# Patient Record
Sex: FEMALE | Race: WHITE | Employment: FULL TIME | URBAN - METROPOLITAN AREA
[De-identification: names, ages, dates, MRNs, and addresses within clinical notes are randomized per-mention and may not be internally consistent; named-entity substitution may affect disease eponyms.]

---

## 2021-11-19 ENCOUNTER — APPOINTMENT (OUTPATIENT)
Dept: GENERAL RADIOLOGY | Age: 66
DRG: 177 | End: 2021-11-19
Attending: EMERGENCY MEDICINE
Payer: MEDICARE

## 2021-11-19 ENCOUNTER — APPOINTMENT (OUTPATIENT)
Dept: CT IMAGING | Age: 66
DRG: 177 | End: 2021-11-19
Attending: EMERGENCY MEDICINE
Payer: MEDICARE

## 2021-11-19 ENCOUNTER — HOSPITAL ENCOUNTER (INPATIENT)
Age: 66
LOS: 4 days | Discharge: HOME | DRG: 177 | End: 2021-11-23
Attending: EMERGENCY MEDICINE | Admitting: HOSPITALIST
Payer: MEDICARE

## 2021-11-19 DIAGNOSIS — U07.1 COVID: Primary | ICD-10-CM

## 2021-11-19 DIAGNOSIS — R09.02 HYPOXIA: ICD-10-CM

## 2021-11-19 PROBLEM — J12.82 PNEUMONIA DUE TO COVID-19 VIRUS: Status: ACTIVE | Noted: 2021-11-19

## 2021-11-19 LAB
ALBUMIN SERPL-MCNC: 3.2 G/DL (ref 3.5–5)
ALBUMIN/GLOB SERPL: 0.9 {RATIO} (ref 1.1–2.2)
ALP SERPL-CCNC: 69 U/L (ref 45–117)
ALT SERPL-CCNC: 48 U/L (ref 12–78)
ANION GAP SERPL CALC-SCNC: 3 MMOL/L (ref 5–15)
AST SERPL-CCNC: 42 U/L (ref 15–37)
BASOPHILS # BLD: 0 K/UL (ref 0–0.1)
BASOPHILS NFR BLD: 0 % (ref 0–1)
BILIRUB SERPL-MCNC: 0.4 MG/DL (ref 0.2–1)
BUN SERPL-MCNC: 6 MG/DL (ref 6–20)
BUN/CREAT SERPL: 11 (ref 12–20)
CALCIUM SERPL-MCNC: 8.9 MG/DL (ref 8.5–10.1)
CHLORIDE SERPL-SCNC: 99 MMOL/L (ref 97–108)
CO2 SERPL-SCNC: 30 MMOL/L (ref 21–32)
COMMENT, HOLDF: NORMAL
COVID-19 RAPID TEST, COVR: DETECTED
CREAT SERPL-MCNC: 0.55 MG/DL (ref 0.55–1.02)
D DIMER PPP FEU-MCNC: 1.59 MG/L FEU (ref 0–0.65)
DIFFERENTIAL METHOD BLD: ABNORMAL
EOSINOPHIL # BLD: 0 K/UL (ref 0–0.4)
EOSINOPHIL NFR BLD: 0 % (ref 0–7)
ERYTHROCYTE [DISTWIDTH] IN BLOOD BY AUTOMATED COUNT: 12.3 % (ref 11.5–14.5)
GLOBULIN SER CALC-MCNC: 3.7 G/DL (ref 2–4)
GLUCOSE SERPL-MCNC: 134 MG/DL (ref 65–100)
HCT VFR BLD AUTO: 39.5 % (ref 35–47)
HGB BLD-MCNC: 13.3 G/DL (ref 11.5–16)
IMM GRANULOCYTES # BLD AUTO: 0 K/UL (ref 0–0.04)
IMM GRANULOCYTES NFR BLD AUTO: 1 % (ref 0–0.5)
LYMPHOCYTES # BLD: 0.5 K/UL (ref 0.8–3.5)
LYMPHOCYTES NFR BLD: 13 % (ref 12–49)
MCH RBC QN AUTO: 30.7 PG (ref 26–34)
MCHC RBC AUTO-ENTMCNC: 33.7 G/DL (ref 30–36.5)
MCV RBC AUTO: 91.2 FL (ref 80–99)
MONOCYTES # BLD: 0.2 K/UL (ref 0–1)
MONOCYTES NFR BLD: 5 % (ref 5–13)
NEUTS SEG # BLD: 3.3 K/UL (ref 1.8–8)
NEUTS SEG NFR BLD: 81 % (ref 32–75)
NRBC # BLD: 0 K/UL (ref 0–0.01)
NRBC BLD-RTO: 0 PER 100 WBC
PLATELET # BLD AUTO: 194 K/UL (ref 150–400)
PMV BLD AUTO: 9.9 FL (ref 8.9–12.9)
POTASSIUM SERPL-SCNC: 3.8 MMOL/L (ref 3.5–5.1)
PROCALCITONIN SERPL-MCNC: <0.05 NG/ML
PROT SERPL-MCNC: 6.9 G/DL (ref 6.4–8.2)
RBC # BLD AUTO: 4.33 M/UL (ref 3.8–5.2)
RBC MORPH BLD: ABNORMAL
SAMPLES BEING HELD,HOLD: NORMAL
SODIUM SERPL-SCNC: 132 MMOL/L (ref 136–145)
SOURCE, COVRS: ABNORMAL
WBC # BLD AUTO: 4 K/UL (ref 3.6–11)

## 2021-11-19 PROCEDURE — 71275 CT ANGIOGRAPHY CHEST: CPT

## 2021-11-19 PROCEDURE — 87635 SARS-COV-2 COVID-19 AMP PRB: CPT

## 2021-11-19 PROCEDURE — 36415 COLL VENOUS BLD VENIPUNCTURE: CPT

## 2021-11-19 PROCEDURE — 93005 ELECTROCARDIOGRAM TRACING: CPT

## 2021-11-19 PROCEDURE — 99285 EMERGENCY DEPT VISIT HI MDM: CPT

## 2021-11-19 PROCEDURE — 74011250636 HC RX REV CODE- 250/636: Performed by: HOSPITALIST

## 2021-11-19 PROCEDURE — 85025 COMPLETE CBC W/AUTO DIFF WBC: CPT

## 2021-11-19 PROCEDURE — 71045 X-RAY EXAM CHEST 1 VIEW: CPT

## 2021-11-19 PROCEDURE — 65270000029 HC RM PRIVATE

## 2021-11-19 PROCEDURE — 85379 FIBRIN DEGRADATION QUANT: CPT

## 2021-11-19 PROCEDURE — 84145 PROCALCITONIN (PCT): CPT

## 2021-11-19 PROCEDURE — 74011000636 HC RX REV CODE- 636: Performed by: RADIOLOGY

## 2021-11-19 PROCEDURE — 74011250636 HC RX REV CODE- 250/636: Performed by: EMERGENCY MEDICINE

## 2021-11-19 PROCEDURE — 80053 COMPREHEN METABOLIC PANEL: CPT

## 2021-11-19 PROCEDURE — 96374 THER/PROPH/DIAG INJ IV PUSH: CPT

## 2021-11-19 RX ORDER — POLYETHYLENE GLYCOL 3350 17 G/17G
17 POWDER, FOR SOLUTION ORAL DAILY PRN
Status: DISCONTINUED | OUTPATIENT
Start: 2021-11-19 | End: 2021-11-23 | Stop reason: HOSPADM

## 2021-11-19 RX ORDER — ENOXAPARIN SODIUM 100 MG/ML
40 INJECTION SUBCUTANEOUS EVERY 12 HOURS
Status: DISCONTINUED | OUTPATIENT
Start: 2021-11-20 | End: 2021-11-23 | Stop reason: HOSPADM

## 2021-11-19 RX ORDER — SODIUM CHLORIDE 0.9 % (FLUSH) 0.9 %
5-40 SYRINGE (ML) INJECTION EVERY 8 HOURS
Status: DISCONTINUED | OUTPATIENT
Start: 2021-11-19 | End: 2021-11-23 | Stop reason: HOSPADM

## 2021-11-19 RX ORDER — ONDANSETRON 4 MG/1
4 TABLET, ORALLY DISINTEGRATING ORAL
Status: DISCONTINUED | OUTPATIENT
Start: 2021-11-19 | End: 2021-11-23 | Stop reason: HOSPADM

## 2021-11-19 RX ORDER — SODIUM CHLORIDE 0.9 % (FLUSH) 0.9 %
5-40 SYRINGE (ML) INJECTION AS NEEDED
Status: DISCONTINUED | OUTPATIENT
Start: 2021-11-19 | End: 2021-11-23 | Stop reason: HOSPADM

## 2021-11-19 RX ORDER — ONDANSETRON 2 MG/ML
4 INJECTION INTRAMUSCULAR; INTRAVENOUS
Status: DISCONTINUED | OUTPATIENT
Start: 2021-11-19 | End: 2021-11-23 | Stop reason: HOSPADM

## 2021-11-19 RX ORDER — ACETAMINOPHEN 325 MG/1
650 TABLET ORAL
Status: DISCONTINUED | OUTPATIENT
Start: 2021-11-19 | End: 2021-11-23 | Stop reason: HOSPADM

## 2021-11-19 RX ORDER — ACETAMINOPHEN 325 MG/1
975 TABLET ORAL
Status: DISCONTINUED | OUTPATIENT
Start: 2021-11-19 | End: 2021-11-19

## 2021-11-19 RX ORDER — DEXAMETHASONE SODIUM PHOSPHATE 10 MG/ML
6 INJECTION INTRAMUSCULAR; INTRAVENOUS ONCE
Status: COMPLETED | OUTPATIENT
Start: 2021-11-19 | End: 2021-11-19

## 2021-11-19 RX ORDER — ENOXAPARIN SODIUM 100 MG/ML
30 INJECTION SUBCUTANEOUS EVERY 12 HOURS
Status: DISCONTINUED | OUTPATIENT
Start: 2021-11-19 | End: 2021-11-19 | Stop reason: DRUGHIGH

## 2021-11-19 RX ORDER — KETOROLAC TROMETHAMINE 30 MG/ML
15 INJECTION, SOLUTION INTRAMUSCULAR; INTRAVENOUS
Status: COMPLETED | OUTPATIENT
Start: 2021-11-19 | End: 2021-11-19

## 2021-11-19 RX ORDER — ACETAMINOPHEN 650 MG/1
650 SUPPOSITORY RECTAL
Status: DISCONTINUED | OUTPATIENT
Start: 2021-11-19 | End: 2021-11-23 | Stop reason: HOSPADM

## 2021-11-19 RX ADMIN — KETOROLAC TROMETHAMINE 15 MG: 30 INJECTION, SOLUTION INTRAMUSCULAR; INTRAVENOUS at 23:02

## 2021-11-19 RX ADMIN — Medication 10 ML: at 23:02

## 2021-11-19 RX ADMIN — IOPAMIDOL 80 ML: 755 INJECTION, SOLUTION INTRAVENOUS at 23:03

## 2021-11-19 RX ADMIN — DEXAMETHASONE SODIUM PHOSPHATE 6 MG: 10 INJECTION, SOLUTION INTRAMUSCULAR; INTRAVENOUS at 22:00

## 2021-11-19 RX ADMIN — ENOXAPARIN SODIUM 40 MG: 100 INJECTION SUBCUTANEOUS at 23:23

## 2021-11-20 LAB
ALBUMIN SERPL-MCNC: 3 G/DL (ref 3.5–5)
ALBUMIN/GLOB SERPL: 0.9 {RATIO} (ref 1.1–2.2)
ALP SERPL-CCNC: 70 U/L (ref 45–117)
ALT SERPL-CCNC: 49 U/L (ref 12–78)
ANION GAP SERPL CALC-SCNC: 2 MMOL/L (ref 5–15)
AST SERPL-CCNC: 37 U/L (ref 15–37)
ATRIAL RATE: 91 BPM
BASOPHILS # BLD: 0 K/UL (ref 0–0.1)
BASOPHILS NFR BLD: 0 % (ref 0–1)
BILIRUB SERPL-MCNC: 0.3 MG/DL (ref 0.2–1)
BUN SERPL-MCNC: 6 MG/DL (ref 6–20)
BUN/CREAT SERPL: 12 (ref 12–20)
CALCIUM SERPL-MCNC: 8.5 MG/DL (ref 8.5–10.1)
CALCULATED P AXIS, ECG09: 30 DEGREES
CALCULATED R AXIS, ECG10: -2 DEGREES
CALCULATED T AXIS, ECG11: 28 DEGREES
CHLORIDE SERPL-SCNC: 101 MMOL/L (ref 97–108)
CO2 SERPL-SCNC: 28 MMOL/L (ref 21–32)
CREAT SERPL-MCNC: 0.51 MG/DL (ref 0.55–1.02)
CRP SERPL-MCNC: 3.88 MG/DL (ref 0–0.6)
DIAGNOSIS, 93000: NORMAL
DIFFERENTIAL METHOD BLD: ABNORMAL
EOSINOPHIL # BLD: 0 K/UL (ref 0–0.4)
EOSINOPHIL NFR BLD: 0 % (ref 0–7)
ERYTHROCYTE [DISTWIDTH] IN BLOOD BY AUTOMATED COUNT: 12.3 % (ref 11.5–14.5)
GLOBULIN SER CALC-MCNC: 3.2 G/DL (ref 2–4)
GLUCOSE SERPL-MCNC: 174 MG/DL (ref 65–100)
HCT VFR BLD AUTO: 39.4 % (ref 35–47)
HGB BLD-MCNC: 13.1 G/DL (ref 11.5–16)
IMM GRANULOCYTES # BLD AUTO: 0 K/UL (ref 0–0.04)
IMM GRANULOCYTES NFR BLD AUTO: 1 % (ref 0–0.5)
LYMPHOCYTES # BLD: 0.4 K/UL (ref 0.8–3.5)
LYMPHOCYTES NFR BLD: 8 % (ref 12–49)
MCH RBC QN AUTO: 30.5 PG (ref 26–34)
MCHC RBC AUTO-ENTMCNC: 33.2 G/DL (ref 30–36.5)
MCV RBC AUTO: 91.8 FL (ref 80–99)
MONOCYTES # BLD: 0.1 K/UL (ref 0–1)
MONOCYTES NFR BLD: 2 % (ref 5–13)
NEUTS SEG # BLD: 4.1 K/UL (ref 1.8–8)
NEUTS SEG NFR BLD: 89 % (ref 32–75)
NRBC # BLD: 0 K/UL (ref 0–0.01)
NRBC BLD-RTO: 0 PER 100 WBC
P-R INTERVAL, ECG05: 124 MS
PLATELET # BLD AUTO: 186 K/UL (ref 150–400)
PMV BLD AUTO: 10 FL (ref 8.9–12.9)
POTASSIUM SERPL-SCNC: 4.4 MMOL/L (ref 3.5–5.1)
POTASSIUM SERPL-SCNC: 5.2 MMOL/L (ref 3.5–5.1)
PROT SERPL-MCNC: 6.2 G/DL (ref 6.4–8.2)
Q-T INTERVAL, ECG07: 352 MS
QRS DURATION, ECG06: 72 MS
QTC CALCULATION (BEZET), ECG08: 432 MS
RBC # BLD AUTO: 4.29 M/UL (ref 3.8–5.2)
RBC MORPH BLD: ABNORMAL
SODIUM SERPL-SCNC: 131 MMOL/L (ref 136–145)
VENTRICULAR RATE, ECG03: 91 BPM
WBC # BLD AUTO: 4.6 K/UL (ref 3.6–11)

## 2021-11-20 PROCEDURE — 36415 COLL VENOUS BLD VENIPUNCTURE: CPT

## 2021-11-20 PROCEDURE — 84132 ASSAY OF SERUM POTASSIUM: CPT

## 2021-11-20 PROCEDURE — 65270000029 HC RM PRIVATE

## 2021-11-20 PROCEDURE — 85025 COMPLETE CBC W/AUTO DIFF WBC: CPT

## 2021-11-20 PROCEDURE — 74011250637 HC RX REV CODE- 250/637: Performed by: INTERNAL MEDICINE

## 2021-11-20 PROCEDURE — XW0DXM6 INTRODUCTION OF BARICITINIB INTO MOUTH AND PHARYNX, EXTERNAL APPROACH, NEW TECHNOLOGY GROUP 6: ICD-10-PCS | Performed by: INTERNAL MEDICINE

## 2021-11-20 PROCEDURE — 74011250636 HC RX REV CODE- 250/636: Performed by: HOSPITALIST

## 2021-11-20 PROCEDURE — 74011250637 HC RX REV CODE- 250/637: Performed by: HOSPITALIST

## 2021-11-20 PROCEDURE — 86140 C-REACTIVE PROTEIN: CPT

## 2021-11-20 PROCEDURE — 80053 COMPREHEN METABOLIC PANEL: CPT

## 2021-11-20 RX ORDER — LISINOPRIL 10 MG/1
20 TABLET ORAL DAILY
Status: DISCONTINUED | OUTPATIENT
Start: 2021-11-20 | End: 2021-11-23 | Stop reason: HOSPADM

## 2021-11-20 RX ORDER — FLUOXETINE HYDROCHLORIDE 20 MG/1
40 CAPSULE ORAL DAILY
Status: DISCONTINUED | OUTPATIENT
Start: 2021-11-20 | End: 2021-11-23 | Stop reason: HOSPADM

## 2021-11-20 RX ORDER — ZINC SULFATE 50(220)MG
1 CAPSULE ORAL DAILY
Status: DISCONTINUED | OUTPATIENT
Start: 2021-11-21 | End: 2021-11-23 | Stop reason: HOSPADM

## 2021-11-20 RX ORDER — DIAZEPAM 10 MG/1
5 TABLET ORAL
Status: DISCONTINUED | OUTPATIENT
Start: 2021-11-20 | End: 2021-11-23 | Stop reason: HOSPADM

## 2021-11-20 RX ORDER — ASCORBIC ACID 500 MG
500 TABLET ORAL 2 TIMES DAILY
Status: DISCONTINUED | OUTPATIENT
Start: 2021-11-20 | End: 2021-11-23 | Stop reason: HOSPADM

## 2021-11-20 RX ORDER — ATORVASTATIN CALCIUM 10 MG/1
10 TABLET, FILM COATED ORAL
Status: DISCONTINUED | OUTPATIENT
Start: 2021-11-20 | End: 2021-11-23 | Stop reason: HOSPADM

## 2021-11-20 RX ADMIN — Medication 5 ML: at 22:00

## 2021-11-20 RX ADMIN — ENOXAPARIN SODIUM 40 MG: 100 INJECTION SUBCUTANEOUS at 12:18

## 2021-11-20 RX ADMIN — Medication 10 ML: at 06:02

## 2021-11-20 RX ADMIN — LISINOPRIL 20 MG: 10 TABLET ORAL at 09:07

## 2021-11-20 RX ADMIN — ATORVASTATIN CALCIUM 10 MG: 10 TABLET, FILM COATED ORAL at 09:07

## 2021-11-20 RX ADMIN — FLUOXETINE 40 MG: 20 CAPSULE ORAL at 09:07

## 2021-11-20 RX ADMIN — BARICITINIB 4 MG: 2 TABLET, FILM COATED ORAL at 12:18

## 2021-11-20 RX ADMIN — DEXAMETHASONE 6 MG: 1 TABLET ORAL at 09:07

## 2021-11-20 RX ADMIN — OXYCODONE HYDROCHLORIDE AND ACETAMINOPHEN 500 MG: 500 TABLET ORAL at 19:02

## 2021-11-20 NOTE — ROUTINE PROCESS
TRANSFER - OUT REPORT:    Verbal report given to RN Sharlene(name) on Clay Rodriguez  being transferred to (unit) for routine progression of care       Report consisted of patients Situation, Background, Assessment and   Recommendations(SBAR). Information from the following report(s) SBAR, Kardex, ED Summary and MAR was reviewed with the receiving nurse. Lines:   Peripheral IV 11/19/21 Left Antecubital (Active)   Site Assessment Clean, dry, & intact 11/19/21 2040   Phlebitis Assessment 0 11/19/21 2040   Infiltration Assessment 0 11/19/21 2040   Dressing Status Clean, dry, & intact 11/19/21 2040   Dressing Type Transparent 11/19/21 2040   Hub Color/Line Status Pink 11/19/21 2040        Opportunity for questions and clarification was provided.       Patient transported with:   O2 @ 2 liters  Tech

## 2021-11-20 NOTE — ED TRIAGE NOTES
78year old female pt comes to the ED for a CC SOB/ COVID positive. Pt states that she has been feeling SOB all day and felt hot. Pt tested positive for Covid on the 10th. EMS state that she was sating at 80 percent when they arrived. Pt was found to be 87 percent on room air in the ED. Pt was placed on O2 via nasal cannula at 2 lpm and now sating at 94 percent. EMS also state that she has vomited once. Pt is A&Ox4.

## 2021-11-20 NOTE — PROGRESS NOTES
Pt's daughter has called for updates several times. Pt with periodic confusion. Daughter states \"I am a nurse and I had Covid and I did not have confusion. \" Discussed patient mentation and diagnosis with risk factors for confusion while inpatient. On assessment pt answers orientation questions and states \"sometimes things are foggy in my head. \" Informed physician regarding daughter's concerns. No new orders at this time.

## 2021-11-20 NOTE — ACP (ADVANCE CARE PLANNING)
Advance Care Planning     Advance Care Planning (ACP) Physician/NP/PA Conversation      Date of Conversation: 11/19/2021  Conducted with: Patient with Decision Making Capacity    Healthcare Decision Maker:   No healthcare decision makers have been documented. Click here to complete [address] including selection of the Healthcare Decision Maker Relationship (ie \"Primary\")    Care Preferences:    Hospitalization: \"If your health worsens and it becomes clear that your chance of recovery is unlikely, what would be your preference regarding hospitalization? \"  The patient would prefer hospitalization. Ventilation: \"If you were unable to breathe on your own and your chance of recovery was unlikely, what would be your preference about the use of a ventilator (breathing machine) if it was available to you? \"   The patient is unsure. and she will think about it, full code for now. Resuscitation: \"In the event your heart stopped as a result of an underlying serious health condition, would you want attempts to be made to restart your heart, or would you prefer a natural death? \"   Yes, attempt to resuscitate.     Additional topics discussed: treatment goals, ventilation preferences, resuscitation preferences and end of life care preferences (vegetative state/imminent death)    Conversation Outcomes / Follow-Up Plan:   ACP in process - information provided, considering goals and options  Reviewed DNR/DNI and patient elects Full Code (Attempt Resuscitation)     Length of Voluntary ACP Conversation in minutes:  16 minutes    Angel Castellon MD

## 2021-11-20 NOTE — PROGRESS NOTES
Transition of Care Plan:                  -RUR 6%  -From home, lives with daughter  -Independent, no devices  -Has 14 steps, may benefit from PT/OT evaluation for DME if appropriate.   -TransportationTBD (Daughter if appropriate)    Reason for Admission:  Shortness of breath. CM unable to reach patient via phone. Assessment completed with patients daughter. Patient lives with daughter in 2 story home that has no steps to enter and 13 steps to 2nd story where the patient resides. The patient is independent, she uses no assisting devices. The patient is a smoker. She has no history of home O2, on room air. Patient uses an inhaler during cold season. She started using the inhaler due to history of PNA several years ago. PCP [facility] El[facility] Patients daughter endorsed that patient has not had home health or been to rehab facility in the past. Patient rarely drives but can drive when stable. Daughter will transport if appropriate at discharge. CM unable to discuss MCR right to appeal with patient per no answer via phone and contact precaution. CM discussed with daughter and she advised no concerns at this time. Medicare pt has received, reviewed, and signed 1st IM letter informing them of their right to appeal the discharge. Signed copy has been placed on pt bedside chart. RUR Score:   6%                 Plan for utilizing home health:    TBD      PCP: First and Last name:  Viri Lewis MD     Current Advanced Directive/Advance Care Plan: Full Code      Healthcare Decision Maker:   Juana Trinidad [phone]    Care Management Interventions  PCP Verified by CM: Yes  Mode of Transport at Discharge:  (TBD)  Transition of Care Consult (CM Consult):  Other (Home with family )  Discharge Durable Medical Equipment: No  Physical Therapy Consult: Yes  Occupational Therapy Consult: Yes  Support Systems: Child(emil)  Confirm Follow Up Transport: Self  The Patient and/or Patient Representative was Provided with a Choice of Provider and Agrees with the Discharge Plan?: Yes  Freedom of Choice List was Provided with Basic Dialogue that Supports the Patient's Individualized Plan of Care/Goals, Treatment Preferences and Shares the Quality Data Associated with the Providers?: Yes  Discharge Location  Discharge Placement: Unable to determine at this time    Eric Tesfaye [facility]

## 2021-11-20 NOTE — H&P
HISTORY AND PHYSICAL      PCP: Maged Spears MD  History source: the patient      CC: shortness of breath      HPI: 78 y.o lady w/ HTN and remote tobacco use who presents with shortness of breath. She was diagnosed with COVID-19 one week ago, was doing better but suddenly began feeling worse. She describes progressive dyspnea, generalized headaches, subjective fever and body aches. She denies diarrhea, has occasional nausea and vomited once. She took ibuprofen without much relief of her symptoms. PMH/PSH:  Past Medical History:   Diagnosis Date    Hypertension     Ill-defined condition     high cholesterol    Psychiatric disorder      Past Surgical History:   Procedure Laterality Date    HX GYN      c section    HX OTHER SURGICAL      breast augmentation       Home meds:   Prior to Admission medications    Medication Sig Start Date End Date Taking? Authorizing Provider   oxyCODONE-acetaminophen (PERCOCET) 5-325 mg per tablet Take 1 Tab by mouth every four (4) hours as needed for Pain. Max Daily Amount: 6 Tabs. 2/8/15   Rayray Peres PA   diazepam (VALIUM) 5 mg tablet Take 1 Tab by mouth every twelve (12) hours as needed (spasm). Max Daily Amount: 10 mg. 2/8/15   KENDAL Hagan   FLUoxetine (PROZAC) 40 mg capsule Take 40 mg by mouth daily. Anamika Nair MD   lisinopril (PRINIVIL, ZESTRIL) 10 mg tablet Take 10 mg by mouth daily. Anamika Nair MD   simvastatin (ZOCOR) 20 mg tablet Take 20 mg by mouth nightly. Anamika Nair MD   amoxicillin-clavulanate (AUGMENTIN) 875-125 mg per tablet Take 1 tablet by mouth two (2) times a day. Anamika Nair MD   HYDROmorphone (DILAUDID) 2 mg tablet Take 1 tablet by mouth every four (4) hours as needed for Pain. 2/5/15   Esvin Orozco MD   ondansetron hcl (ZOFRAN, AS HYDROCHLORIDE,) 4 mg tablet Take 1 tablet by mouth every eight (8) hours as needed for Nausea.  2/5/15   Esvin Orozco MD       Allergies:  No Known Allergies    FH:  History reviewed. No pertinent family history. SH:  Social History     Tobacco Use    Smoking status: Current Some Day Smoker    Smokeless tobacco: Not on file   Substance Use Topics    Alcohol use: No       ROS: A comprehensive review of systems was negative except for that written in the HPI.       PHYSICAL EXAM:  Visit Vitals  BP (!) 145/84   Pulse 87   Temp 99.4 °F (37.4 °C)   Resp 16   Ht 5' 2\" (1.575 m)   Wt 85.3 kg (188 lb 0.8 oz)   SpO2 93%   BMI 34.40 kg/m²       Gen: NAD, non-toxic  HEENT: anicteric sclerae, normal conjunctiva  Neck: supple, trachea midline, no adenopathy  Heart: RRR, no MRG, no JVD, no peripheral edema  Lungs: feint bibasilar crackles, non-labored respirations  Abd: soft, NT, ND, BS+  Extr: warm  Skin: dry  Neuro: CN II-XII grossly intact, normal speech, moves all extremities  Psych: normal mood, appropriate affect      Labs/Imaging:  Recent Results (from the past 24 hour(s))   EKG, 12 LEAD, INITIAL    Collection Time: 11/19/21  8:31 PM   Result Value Ref Range    Ventricular Rate 91 BPM    Atrial Rate 91 BPM    P-R Interval 124 ms    QRS Duration 72 ms    Q-T Interval 352 ms    QTC Calculation (Bezet) 432 ms    Calculated P Axis 30 degrees    Calculated R Axis -2 degrees    Calculated T Axis 28 degrees    Diagnosis       Normal sinus rhythm  Minimal voltage criteria for LVH, may be normal variant ( R in aVL )  Nonspecific ST abnormality  Abnormal ECG  When compared with ECG of 23-DEC-2008 13:10,  Previous ECG has undetermined rhythm, needs review     CBC WITH AUTOMATED DIFF    Collection Time: 11/19/21  8:45 PM   Result Value Ref Range    WBC 4.0 3.6 - 11.0 K/uL    RBC 4.33 3.80 - 5.20 M/uL    HGB 13.3 11.5 - 16.0 g/dL    HCT 39.5 35.0 - 47.0 %    MCV 91.2 80.0 - 99.0 FL    MCH 30.7 26.0 - 34.0 PG    MCHC 33.7 30.0 - 36.5 g/dL    RDW 12.3 11.5 - 14.5 %    PLATELET 050 458 - 815 K/uL    MPV 9.9 8.9 - 12.9 FL    NRBC 0.0 0  WBC    ABSOLUTE NRBC 0.00 0.00 - 0.01 K/uL    NEUTROPHILS 81 (H) 32 - 75 %    LYMPHOCYTES 13 12 - 49 %    MONOCYTES 5 5 - 13 %    EOSINOPHILS 0 0 - 7 %    BASOPHILS 0 0 - 1 %    IMMATURE GRANULOCYTES 1 (H) 0.0 - 0.5 %    ABS. NEUTROPHILS 3.3 1.8 - 8.0 K/UL    ABS. LYMPHOCYTES 0.5 (L) 0.8 - 3.5 K/UL    ABS. MONOCYTES 0.2 0.0 - 1.0 K/UL    ABS. EOSINOPHILS 0.0 0.0 - 0.4 K/UL    ABS. BASOPHILS 0.0 0.0 - 0.1 K/UL    ABS. IMM. GRANS. 0.0 0.00 - 0.04 K/UL    DF SMEAR SCANNED      RBC COMMENTS NORMOCYTIC, NORMOCHROMIC     METABOLIC PANEL, COMPREHENSIVE    Collection Time: 11/19/21  8:45 PM   Result Value Ref Range    Sodium 132 (L) 136 - 145 mmol/L    Potassium 3.8 3.5 - 5.1 mmol/L    Chloride 99 97 - 108 mmol/L    CO2 30 21 - 32 mmol/L    Anion gap 3 (L) 5 - 15 mmol/L    Glucose 134 (H) 65 - 100 mg/dL    BUN 6 6 - 20 MG/DL    Creatinine 0.55 0.55 - 1.02 MG/DL    BUN/Creatinine ratio 11 (L) 12 - 20      GFR est AA >60 >60 ml/min/1.73m2    GFR est non-AA >60 >60 ml/min/1.73m2    Calcium 8.9 8.5 - 10.1 MG/DL    Bilirubin, total 0.4 0.2 - 1.0 MG/DL    ALT (SGPT) 48 12 - 78 U/L    AST (SGOT) 42 (H) 15 - 37 U/L    Alk. phosphatase 69 45 - 117 U/L    Protein, total 6.9 6.4 - 8.2 g/dL    Albumin 3.2 (L) 3.5 - 5.0 g/dL    Globulin 3.7 2.0 - 4.0 g/dL    A-G Ratio 0.9 (L) 1.1 - 2.2     SAMPLES BEING HELD    Collection Time: 11/19/21  8:45 PM   Result Value Ref Range    SAMPLES BEING HELD 1RED, 1BLU     COMMENT        Add-on orders for these samples will be processed based on acceptable specimen integrity and analyte stability, which may vary by analyte.    D DIMER    Collection Time: 11/19/21  8:45 PM   Result Value Ref Range    D-dimer 1.59 (H) 0.00 - 0.65 mg/L FEU   PROCALCITONIN    Collection Time: 11/19/21  8:45 PM   Result Value Ref Range    Procalcitonin <0.05 ng/mL   COVID-19 RAPID TEST    Collection Time: 11/19/21 10:01 PM   Result Value Ref Range    Specimen source Nasopharyngeal      COVID-19 rapid test Detected (AA) NOTD         Recent Labs 11/19/21 2045   WBC 4.0   HGB 13.3   HCT 39.5        Recent Labs     11/19/21 2045   *   K 3.8   CL 99   CO2 30   BUN 6   CREA 0.55   *   CA 8.9     Recent Labs     11/19/21 2045   ALT 48   AP 69   TBILI 0.4   TP 6.9   ALB 3.2*   GLOB 3.7       No results for input(s): CPK, CKNDX, TROIQ in the last 72 hours. No lab exists for component: CPKMB    No results for input(s): INR, PTP, APTT, INREXT in the last 72 hours. No results for input(s): PH, PCO2, PO2 in the last 72 hours. CTA CHEST W OR W WO CONT    Result Date: 11/19/2021  No evidence of acute pulmonary embolus. Diffuse bilateral patchy consolidation and groundglass opacities are compatible with COVID pneumonia. Mild hepatic steatosis. XR CHEST PORT    Result Date: 11/19/2021  Small areas of patchy opacification in the lung bases likely related to the patient's history of viral illness.            Assessment & Plan:     Acute respiratory failure with hypoxia due to COVID-19 pneumonia:  -O2 support  -dexamethasone  -procalcitonin low will not start abx for now  -check CRP    HTN:  -continue lisinopril     DVT ppx: sq Lovenox  Code status: full  Disposition: home when ready    Signed By: Margarette Gutiérrez MD     November 19, 2021

## 2021-11-20 NOTE — PROGRESS NOTES
[address] [facility]  Hospitalist Group                                                                                          Hospitalist Progress Note  Tasha Siddiqui MD  Answering service: [phone] OR [phone] from in house phone        Date of Service:  2021  NAME:  Kalyani Lua  :  1955  MRN:  [id number]      Admission Summary:   Copied for Dr. Ash Epstein H&P:    HPI: 78 y.o lady w/ HTN and remote tobacco use who presents with shortness of breath. She was diagnosed with COVID-19 one week ago, was doing better but suddenly began feeling worse. She describes progressive dyspnea, generalized headaches, subjective fever and body aches. She denies diarrhea, has occasional nausea and vomited once. She took ibuprofen without much relief of her symptoms. Interval history / Subjective:   Patient tells me that she is feeling better and offers no new complaints. She tells me that she is not vaccinated. Assessment & Plan:     Acute respiratory failure with hypoxia due to COVID-19 pneumonia:  -O2 support 2 L  -dexamethasone, baricitinib, Vit C and zinc  - Lovenox  -procalcitonin low, CRP 3.88 -- will not start abx for now     HTN, controlled  -continue lisinopril     Hyperkalemia  -repeat K    Code status: Full  DVT prophylaxis: Lovenox    Care Plan discussed with: Patient/Family  Anticipated Disposition: Home w/Family  Anticipated Discharge: Greater than 48 hours     Hospital Problems  Never Reviewed          Codes Class Noted POA    Pneumonia due to COVID-19 virus ICD-10-CM: U07.1, J12.82  ICD-9-CM: 480.8, 079.89  2021 Unknown                Review of Systems:   Pertinent items are noted in HPI. Vital Signs:    Last 24hrs VS reviewed since prior progress note.  Most recent are:  Visit Vitals  /80   Pulse 78   Temp 97.4 °F (36.3 °C)   Resp 18   Ht 5' 2\" (1.575 m)   Wt 85.3 kg (188 lb 0.8 oz)   SpO2 92%   BMI 34.40 kg/m²       No intake or output data in the 24 hours ending 11/20/21 1415     Physical Examination:             Constitutional:  No acute distress, cooperative, pleasant        Resp:  CTA bilaterally. No wheezing/rhonchi/rales. No accessory muscle use, coughs with inspiration   CV:  Regular rhythm, normal rate, no murmurs, gallops, rubs    GI:  Soft, non distended, non tender. normoactive bowel sounds, no hepatosplenomegaly     Musculoskeletal:  No edema, warm, 2+ pulses throughout    Neurologic:  Moves all extremities. AAOx3, CN II-XII reviewed            Data Review:    Review and/or order of clinical lab test      Labs:     Recent Labs     11/20/21 0045 11/19/21 2045   WBC 4.6 4.0   HGB 13.1 13.3   HCT 39.4 39.5    194     Recent Labs     11/20/21 0045 11/19/21 2045   * 132*   K 5.2* 3.8    99   CO2 28 30   BUN 6 6   CREA 0.51* 0.55   * 134*   CA 8.5 8.9     Recent Labs     11/20/21 0045 11/19/21 2045   ALT 49 48   AP 70 69   TBILI 0.3 0.4   TP 6.2* 6.9   ALB 3.0* 3.2*   GLOB 3.2 3.7     No results for input(s): INR, PTP, APTT, INREXT in the last 72 hours. No results for input(s): FE, TIBC, PSAT, FERR in the last 72 hours. No results found for: FOL, RBCF   No results for input(s): PH, PCO2, PO2 in the last 72 hours. No results for input(s): CPK, CKNDX, TROIQ in the last 72 hours.     No lab exists for component: CPKMB  No results found for: CHOL, CHOLX, CHLST, CHOLV, HDL, HDLP, LDL, LDLC, DLDLP, TGLX, TRIGL, TRIGP, CHHD, CHHDX  No results found for: [facility]  Lab Results   Component Value Date/Time    Color YELLOW/STRAW 02/05/2015 01:55 PM    Appearance CLEAR 02/05/2015 01:55 PM    Specific gravity 1.005 02/05/2015 01:55 PM    pH (UA) 5.5 02/05/2015 01:55 PM    Protein NEGATIVE  02/05/2015 01:55 PM    Glucose NEGATIVE  02/05/2015 01:55 PM    Ketone NEGATIVE  02/05/2015 01:55 PM    Bilirubin NEGATIVE  02/05/2015 01:55 PM    Urobilinogen 0.2 02/05/2015 01:55 PM    Nitrites NEGATIVE  02/05/2015 01:55 PM    Leukocyte Esterase TRACE (A) 02/05/2015 01:55 PM    Epithelial cells FEW 02/05/2015 01:55 PM    Bacteria NEGATIVE  02/05/2015 01:55 PM    WBC 0-4 02/05/2015 01:55 PM    RBC 0-5 02/05/2015 01:55 PM         Medications Reviewed:     Current Facility-Administered Medications   Medication Dose Route Frequency    FLUoxetine (PROzac) capsule 40 mg  40 mg Oral DAILY    lisinopriL (PRINIVIL, ZESTRIL) tablet 20 mg  20 mg Oral DAILY    diazePAM (VALIUM) tablet 5 mg  5 mg Oral Q12H PRN    atorvastatin (LIPITOR) tablet 10 mg  10 mg Oral QHS    baricitinib (OLUMIANT) tablet 4 mg  4 mg Oral DAILY    [START ON 11/21/2021] zinc sulfate (ZINCATE) 50 mg zinc (220 mg) capsule 1 Capsule  1 Capsule Oral DAILY    ascorbic acid (vitamin C) (VITAMIN C) tablet 500 mg  500 mg Oral BID    sodium chloride (NS) flush 5-40 mL  5-40 mL IntraVENous Q8H    sodium chloride (NS) flush 5-40 mL  5-40 mL IntraVENous PRN    acetaminophen (TYLENOL) tablet 650 mg  650 mg Oral Q6H PRN    Or    acetaminophen (TYLENOL) suppository 650 mg  650 mg Rectal Q6H PRN    polyethylene glycol (MIRALAX) packet 17 g  17 g Oral DAILY PRN    ondansetron (ZOFRAN ODT) tablet 4 mg  4 mg Oral Q8H PRN    Or    ondansetron (ZOFRAN) injection 4 mg  4 mg IntraVENous Q6H PRN    dexAMETHasone (DECADRON) tablet 6 mg  6 mg Oral DAILY    enoxaparin (LOVENOX) injection 40 mg  40 mg SubCUTAneous Q12H     ______________________________________________________________________  EXPECTED LENGTH OF STAY: - - -  ACTUAL LENGTH OF STAY:          1                 Renetta Sepulveda MD

## 2021-11-21 LAB
AMMONIA PLAS-SCNC: 36 UMOL/L
ANION GAP SERPL CALC-SCNC: 2 MMOL/L (ref 5–15)
BUN SERPL-MCNC: 11 MG/DL (ref 6–20)
BUN/CREAT SERPL: 17 (ref 12–20)
CALCIUM SERPL-MCNC: 8.8 MG/DL (ref 8.5–10.1)
CHLORIDE SERPL-SCNC: 101 MMOL/L (ref 97–108)
CO2 SERPL-SCNC: 31 MMOL/L (ref 21–32)
COMMENT, HOLDF: NORMAL
CREAT SERPL-MCNC: 0.63 MG/DL (ref 0.55–1.02)
CRP SERPL-MCNC: 1.74 MG/DL (ref 0–0.6)
CRP SERPL-MCNC: 2.56 MG/DL (ref 0–0.6)
ERYTHROCYTE [DISTWIDTH] IN BLOOD BY AUTOMATED COUNT: 12.2 % (ref 11.5–14.5)
GLUCOSE SERPL-MCNC: 102 MG/DL (ref 65–100)
HCT VFR BLD AUTO: 42.3 % (ref 35–47)
HGB BLD-MCNC: 13.8 G/DL (ref 11.5–16)
MCH RBC QN AUTO: 30.7 PG (ref 26–34)
MCHC RBC AUTO-ENTMCNC: 32.6 G/DL (ref 30–36.5)
MCV RBC AUTO: 94 FL (ref 80–99)
NRBC # BLD: 0 K/UL (ref 0–0.01)
NRBC BLD-RTO: 0 PER 100 WBC
PLATELET # BLD AUTO: 289 K/UL (ref 150–400)
PMV BLD AUTO: 10.4 FL (ref 8.9–12.9)
POTASSIUM SERPL-SCNC: 3.9 MMOL/L (ref 3.5–5.1)
RBC # BLD AUTO: 4.5 M/UL (ref 3.8–5.2)
SAMPLES BEING HELD,HOLD: NORMAL
SODIUM SERPL-SCNC: 134 MMOL/L (ref 136–145)
TSH SERPL DL<=0.05 MIU/L-ACNC: 0.21 UIU/ML (ref 0.36–3.74)
WBC # BLD AUTO: 8.3 K/UL (ref 3.6–11)

## 2021-11-21 PROCEDURE — 84443 ASSAY THYROID STIM HORMONE: CPT

## 2021-11-21 PROCEDURE — 74011250637 HC RX REV CODE- 250/637: Performed by: INTERNAL MEDICINE

## 2021-11-21 PROCEDURE — 86140 C-REACTIVE PROTEIN: CPT

## 2021-11-21 PROCEDURE — 74011250637 HC RX REV CODE- 250/637: Performed by: HOSPITALIST

## 2021-11-21 PROCEDURE — 65270000029 HC RM PRIVATE

## 2021-11-21 PROCEDURE — 80048 BASIC METABOLIC PNL TOTAL CA: CPT

## 2021-11-21 PROCEDURE — 85027 COMPLETE CBC AUTOMATED: CPT

## 2021-11-21 PROCEDURE — 82140 ASSAY OF AMMONIA: CPT

## 2021-11-21 PROCEDURE — 74011250636 HC RX REV CODE- 250/636: Performed by: HOSPITALIST

## 2021-11-21 PROCEDURE — 36415 COLL VENOUS BLD VENIPUNCTURE: CPT

## 2021-11-21 RX ADMIN — ATORVASTATIN CALCIUM 10 MG: 10 TABLET, FILM COATED ORAL at 08:23

## 2021-11-21 RX ADMIN — ZINC SULFATE 220 MG (50 MG) CAPSULE 1 CAPSULE: CAPSULE at 08:24

## 2021-11-21 RX ADMIN — Medication 10 ML: at 22:50

## 2021-11-21 RX ADMIN — Medication 10 ML: at 17:15

## 2021-11-21 RX ADMIN — ENOXAPARIN SODIUM 40 MG: 100 INJECTION SUBCUTANEOUS at 01:05

## 2021-11-21 RX ADMIN — OXYCODONE HYDROCHLORIDE AND ACETAMINOPHEN 500 MG: 500 TABLET ORAL at 17:15

## 2021-11-21 RX ADMIN — Medication 5 ML: at 06:00

## 2021-11-21 RX ADMIN — LISINOPRIL 20 MG: 10 TABLET ORAL at 08:23

## 2021-11-21 RX ADMIN — DEXAMETHASONE 6 MG: 1 TABLET ORAL at 08:23

## 2021-11-21 RX ADMIN — FLUOXETINE 40 MG: 20 CAPSULE ORAL at 08:24

## 2021-11-21 RX ADMIN — BARICITINIB 4 MG: 2 TABLET, FILM COATED ORAL at 08:28

## 2021-11-21 RX ADMIN — ENOXAPARIN SODIUM 40 MG: 100 INJECTION SUBCUTANEOUS at 11:27

## 2021-11-21 RX ADMIN — OXYCODONE HYDROCHLORIDE AND ACETAMINOPHEN 500 MG: 500 TABLET ORAL at 08:24

## 2021-11-21 NOTE — PROGRESS NOTES
[address] [facility]  Hospitalist Group                                                                                          Hospitalist Progress Note  Maicol Blair MD  Answering service: [phone] OR [phone] from in house phone        Date of Service:  2021  NAME:  Karri Chang  :  1955  MRN:  [id number]      Admission Summary:   Copied for Dr. Gini Templeton H&P:    HPI: 78 y.o lady w/ HTN and remote tobacco use who presents with shortness of breath. She was diagnosed with COVID-19 one week ago, was doing better but suddenly began feeling worse. She describes progressive dyspnea, generalized headaches, subjective fever and body aches. She denies diarrhea, has occasional nausea and vomited once. She took ibuprofen without much relief of her symptoms. Interval history / Subjective:   Ms. Aminata Hay tells me that she is not feeling as well. She is up and walking in the room. When asked specifically what is wrong, she tells me that she feels hot and that she is tired. She denies SOB. She also admits that she gets a little confused and loses her \"train of thought\" while telling a story. While speaking, she threw in a non-word that sounded like what she was talking about -- she said \"mimosy. \"  I repeated the word in a questioning manner and she jokingly said \"isn't that a drink? \"  She does easily lose her concentration. However, she remembered my name.      Assessment & Plan:     Acute respiratory failure with hypoxia due to COVID-19 pneumonia:  - unvaccinated  - O2 support remains stable 2 L  -dexamethasone, baricitinib, Vit C and zinc  - Lovenox  - over a week since symptoms started so no Remdesivir    Confusion with toxic/septic encephalopathy due to steroids/Covid  - monitor for now  - will check TSH, ammonia  - if worsening, consider stopping steroids     HTN, controlled  -continue lisinopril     Code status: Full  DVT prophylaxis: Lovenox    Care Plan discussed with: Patient/Family. Spoke with daughter Fela Hebert at [phone]  Anticipated Disposition: Home w/Family  Anticipated Discharge: Greater than 48 hours     Hospital Problems  Never Reviewed          Codes Class Noted POA    Pneumonia due to COVID-19 virus ICD-10-CM: U07.1, J12.82  ICD-9-CM: 480.8, 079.89  11/19/2021 Unknown                Review of Systems:   Pertinent items are noted in HPI. Vital Signs:    Last 24hrs VS reviewed since prior progress note. Most recent are:  Visit Vitals  /76   Pulse 76   Temp 98 °F (36.7 °C)   Resp 18   Ht 5' 2\" (1.575 m)   Wt 85.3 kg (188 lb 0.8 oz)   SpO2 92%   BMI 34.40 kg/m²       No intake or output data in the 24 hours ending 11/21/21 1110     Physical Examination:             Constitutional:  No acute distress, cooperative, pleasant, A/OX3, answers questions appropriately but does lose concentration with long stories       Resp:  CTA bilaterally except end-inspiratory wheeze. No rhonchi/rales. No accessory muscle use, coughs with inspiration   CV:  Regular rhythm, normal rate, no murmurs, gallops, rubs    GI:  Soft, non distended, non tender. normoactive bowel sounds, no hepatosplenomegaly     Musculoskeletal:  No edema, warm, 2+ pulses throughout    Neurologic:  Moves all extremities. AAOx3, CN II-XII reviewed, no focal deficits on my exam.            Data Review:    Review and/or order of clinical lab test      Labs:     Recent Labs     11/21/21  0604 11/20/21  0045   WBC 8.3 4.6   HGB 13.8 13.1   HCT 42.3 39.4    186     Recent Labs     11/21/21  0604 11/20/21  1517 11/20/21  0045 11/19/21  2045 11/19/21  2045   *  --  131*  --  132*   K 3.9 4.4 5.2*   < > 3.8     --  101  --  99   CO2 31  --  28  --  30   BUN 11  --  6  --  6   CREA 0.63  --  0.51*  --  0.55   *  --  174*  --  134*   CA 8.8  --  8.5  --  8.9    < > = values in this interval not displayed.      Recent Labs     11/20/21 0045 11/19/21 2045   ALT 49 48   AP 70 69   TBILI 0.3 0.4   TP 6.2* 6.9   ALB 3.0* 3.2*   GLOB 3.2 3.7     No results for input(s): INR, PTP, APTT, INREXT, INREXT in the last 72 hours. No results for input(s): FE, TIBC, PSAT, FERR in the last 72 hours. No results found for: FOL, RBCF   No results for input(s): PH, PCO2, PO2 in the last 72 hours. No results for input(s): CPK, CKNDX, TROIQ in the last 72 hours.     No lab exists for component: CPKMB  No results found for: CHOL, CHOLX, CHLST, CHOLV, HDL, HDLP, LDL, LDLC, DLDLP, TGLX, TRIGL, TRIGP, CHHD, CHHDX  No results found for: [facility]  Lab Results   Component Value Date/Time    Color YELLOW/STRAW 02/05/2015 01:55 PM    Appearance CLEAR 02/05/2015 01:55 PM    Specific gravity 1.005 02/05/2015 01:55 PM    pH (UA) 5.5 02/05/2015 01:55 PM    Protein NEGATIVE  02/05/2015 01:55 PM    Glucose NEGATIVE  02/05/2015 01:55 PM    Ketone NEGATIVE  02/05/2015 01:55 PM    Bilirubin NEGATIVE  02/05/2015 01:55 PM    Urobilinogen 0.2 02/05/2015 01:55 PM    Nitrites NEGATIVE  02/05/2015 01:55 PM    Leukocyte Esterase TRACE (A) 02/05/2015 01:55 PM    Epithelial cells FEW 02/05/2015 01:55 PM    Bacteria NEGATIVE  02/05/2015 01:55 PM    WBC 0-4 02/05/2015 01:55 PM    RBC 0-5 02/05/2015 01:55 PM         Medications Reviewed:     Current Facility-Administered Medications   Medication Dose Route Frequency    FLUoxetine (PROzac) capsule 40 mg  40 mg Oral DAILY    lisinopriL (PRINIVIL, ZESTRIL) tablet 20 mg  20 mg Oral DAILY    diazePAM (VALIUM) tablet 5 mg  5 mg Oral Q12H PRN    atorvastatin (LIPITOR) tablet 10 mg  10 mg Oral QHS    baricitinib (OLUMIANT) tablet 4 mg  4 mg Oral DAILY    zinc sulfate (ZINCATE) 50 mg zinc (220 mg) capsule 1 Capsule  1 Capsule Oral DAILY    ascorbic acid (vitamin C) (VITAMIN C) tablet 500 mg  500 mg Oral BID    sodium chloride (NS) flush 5-40 mL  5-40 mL IntraVENous Q8H    sodium chloride (NS) flush 5-40 mL  5-40 mL IntraVENous PRN    acetaminophen (TYLENOL) tablet 650 mg  650 mg Oral Q6H PRN    Or  acetaminophen (TYLENOL) suppository 650 mg  650 mg Rectal Q6H PRN    polyethylene glycol (MIRALAX) packet 17 g  17 g Oral DAILY PRN    ondansetron (ZOFRAN ODT) tablet 4 mg  4 mg Oral Q8H PRN    Or    ondansetron (ZOFRAN) injection 4 mg  4 mg IntraVENous Q6H PRN    dexAMETHasone (DECADRON) tablet 6 mg  6 mg Oral DAILY    enoxaparin (LOVENOX) injection 40 mg  40 mg SubCUTAneous Q12H     ______________________________________________________________________  EXPECTED LENGTH OF STAY: - - -  ACTUAL LENGTH OF STAY:          2                 Shanna Nguyen MD

## 2021-11-22 LAB
CRP SERPL-MCNC: 3.4 MG/DL (ref 0–0.6)
D DIMER PPP FEU-MCNC: 0.8 MG/L FEU (ref 0–0.65)
PROCALCITONIN SERPL-MCNC: <0.05 NG/ML

## 2021-11-22 PROCEDURE — 65270000029 HC RM PRIVATE

## 2021-11-22 PROCEDURE — 84145 PROCALCITONIN (PCT): CPT

## 2021-11-22 PROCEDURE — 85379 FIBRIN DEGRADATION QUANT: CPT

## 2021-11-22 PROCEDURE — 86140 C-REACTIVE PROTEIN: CPT

## 2021-11-22 PROCEDURE — 74011250637 HC RX REV CODE- 250/637: Performed by: INTERNAL MEDICINE

## 2021-11-22 PROCEDURE — 74011250637 HC RX REV CODE- 250/637: Performed by: HOSPITALIST

## 2021-11-22 PROCEDURE — 36415 COLL VENOUS BLD VENIPUNCTURE: CPT

## 2021-11-22 PROCEDURE — 74011250636 HC RX REV CODE- 250/636: Performed by: HOSPITALIST

## 2021-11-22 RX ADMIN — ENOXAPARIN SODIUM 40 MG: 100 INJECTION SUBCUTANEOUS at 11:32

## 2021-11-22 RX ADMIN — OXYCODONE HYDROCHLORIDE AND ACETAMINOPHEN 500 MG: 500 TABLET ORAL at 17:15

## 2021-11-22 RX ADMIN — ZINC SULFATE 220 MG (50 MG) CAPSULE 1 CAPSULE: CAPSULE at 09:38

## 2021-11-22 RX ADMIN — Medication 10 ML: at 07:29

## 2021-11-22 RX ADMIN — OXYCODONE HYDROCHLORIDE AND ACETAMINOPHEN 500 MG: 500 TABLET ORAL at 09:38

## 2021-11-22 RX ADMIN — LISINOPRIL 20 MG: 10 TABLET ORAL at 09:38

## 2021-11-22 RX ADMIN — Medication 10 ML: at 14:46

## 2021-11-22 RX ADMIN — ATORVASTATIN CALCIUM 10 MG: 10 TABLET, FILM COATED ORAL at 09:38

## 2021-11-22 RX ADMIN — LACTULOSE 45 ML: 20 SOLUTION ORAL at 19:01

## 2021-11-22 RX ADMIN — DEXAMETHASONE 6 MG: 1 TABLET ORAL at 09:38

## 2021-11-22 RX ADMIN — ENOXAPARIN SODIUM 40 MG: 100 INJECTION SUBCUTANEOUS at 00:22

## 2021-11-22 RX ADMIN — BARICITINIB 4 MG: 2 TABLET, FILM COATED ORAL at 09:37

## 2021-11-22 RX ADMIN — FLUOXETINE 40 MG: 20 CAPSULE ORAL at 09:37

## 2021-11-22 RX ADMIN — Medication 10 ML: at 22:04

## 2021-11-22 NOTE — PROGRESS NOTES
[address] [facility] Adult  Hospitalist Group                                                                                          Hospitalist Progress Note  Patricia Dean MD  Answering service: [phone] from in house phone        Date of Service:  2021  NAME:  Yolanda Beatty  :  1955  MRN:  [id number]      Admission Summary:   Copied for Dr. Harmon Running H&P:    HPI: 78 y.o lady w/ HTN and remote tobacco use who presents with shortness of breath. She was diagnosed with COVID-19 one week ago, was doing better but suddenly began feeling worse. She describes progressive dyspnea, generalized headaches, subjective fever and body aches. She denies diarrhea, has occasional nausea and vomited once. She took ibuprofen without much relief of her symptoms. Interval history / Subjective:     2021. No acute events overnight. Saw patient this morning during rounds, comfortably resting in bed, awake alert and oriented, pleasant uncooperative with physical examination, does not appear to be in any apparent distress, denies any fever, chills, nausea, vomiting. Patient could potentially be discharged home with home O2 as far as her COVID-19 care goes as she is minimally symptomatic, but her family has concern about her elevated ammonia and hepatic steatosis, patient is pending evaluation by gastroenterology. Possible discharge home tomorrow.      Assessment & Plan:     Acute respiratory failure with hypoxia due to COVID-19 pneumonia:  - unvaccinated  - O2 support remains stable 2 L  -dexamethasone, baricitinib, Vit C and zinc  - Lovenox  - over a week since symptoms started so no Remdesivir    Confusion with toxic/septic encephalopathy due to steroids/Covid  -TSH 0.21 monitor for now  - will check TSH, ammonia  - if worsening, consider stopping steroids     HTN, controlled  -continue lisinopril     Code status: Full  DVT prophylaxis: Lovenox    Care Plan discussed with: Patient/Family. Spoke with daughter Tom Ko at [phone]  Anticipated Disposition: Home w/Family  Anticipated Discharge: Greater than 48 hours     Hospital Problems  Never Reviewed          Codes Class Noted POA    Pneumonia due to COVID-19 virus ICD-10-CM: U07.1, J12.82  ICD-9-CM: 480.8, 079.89  11/19/2021 Unknown                Review of Systems:   Pertinent items are noted in HPI. Vital Signs:    Last 24hrs VS reviewed since prior progress note. Most recent are:  Visit Vitals  /73 (BP 1 Location: Right arm, BP Patient Position: At rest)   Pulse 79   Temp 98 °F (36.7 °C)   Resp 18   Ht 5' 2\" (1.575 m)   Wt 85.3 kg (188 lb 0.8 oz)   SpO2 91%   BMI 34.40 kg/m²       No intake or output data in the 24 hours ending 11/22/21 1831     Physical Examination:             Constitutional:  No acute distress, cooperative, pleasant, A/OX3, answers questions appropriately but does lose concentration with long stories       Resp:  CTA bilaterally except end-inspiratory wheeze. No rhonchi/rales. No accessory muscle use, coughs with inspiration   CV:  Regular rhythm, normal rate, no murmurs, gallops, rubs    GI:  Soft, non distended, non tender. normoactive bowel sounds, no hepatosplenomegaly     Musculoskeletal:  No edema, warm, 2+ pulses throughout    Neurologic:  Moves all extremities. AAOx3, CN II-XII reviewed, no focal deficits on my exam.            Data Review:    Review and/or order of clinical lab test      Labs:     Recent Labs     11/21/21  0604 11/20/21  0045   WBC 8.3 4.6   HGB 13.8 13.1   HCT 42.3 39.4    186     Recent Labs     11/21/21  0604 11/20/21  1517 11/20/21  0045 11/19/21  2045 11/19/21 2045   *  --  131*  --  132*   K 3.9 4.4 5.2*   < > 3.8     --  101  --  99   CO2 31  --  28  --  30   BUN 11  --  6  --  6   CREA 0.63  --  0.51*  --  0.55   *  --  174*  --  134*   CA 8.8  --  8.5  --  8.9    < > = values in this interval not displayed.      Recent Labs 11/20/21 0045 11/19/21 2045   ALT 49 48   AP 70 69   TBILI 0.3 0.4   TP 6.2* 6.9   ALB 3.0* 3.2*   GLOB 3.2 3.7     No results for input(s): INR, PTP, APTT, INREXT, INREXT in the last 72 hours. No results for input(s): FE, TIBC, PSAT, FERR in the last 72 hours. No results found for: FOL, RBCF   No results for input(s): PH, PCO2, PO2 in the last 72 hours. No results for input(s): CPK, CKNDX, TROIQ in the last 72 hours.     No lab exists for component: CPKMB  No results found for: CHOL, CHOLX, CHLST, CHOLV, HDL, HDLP, LDL, LDLC, DLDLP, TGLX, TRIGL, TRIGP, CHHD, CHHDX  No results found for: [facility]  Lab Results   Component Value Date/Time    Color YELLOW/STRAW 02/05/2015 01:55 PM    Appearance CLEAR 02/05/2015 01:55 PM    Specific gravity 1.005 02/05/2015 01:55 PM    pH (UA) 5.5 02/05/2015 01:55 PM    Protein NEGATIVE  02/05/2015 01:55 PM    Glucose NEGATIVE  02/05/2015 01:55 PM    Ketone NEGATIVE  02/05/2015 01:55 PM    Bilirubin NEGATIVE  02/05/2015 01:55 PM    Urobilinogen 0.2 02/05/2015 01:55 PM    Nitrites NEGATIVE  02/05/2015 01:55 PM    Leukocyte Esterase TRACE (A) 02/05/2015 01:55 PM    Epithelial cells FEW 02/05/2015 01:55 PM    Bacteria NEGATIVE  02/05/2015 01:55 PM    WBC 0-4 02/05/2015 01:55 PM    RBC 0-5 02/05/2015 01:55 PM         Medications Reviewed:     Current Facility-Administered Medications   Medication Dose Route Frequency    lactulose (CHRONULAC) 10 gram/15 mL solution 45 mL  30 g Oral ONCE    FLUoxetine (PROzac) capsule 40 mg  40 mg Oral DAILY    lisinopriL (PRINIVIL, ZESTRIL) tablet 20 mg  20 mg Oral DAILY    diazePAM (VALIUM) tablet 5 mg  5 mg Oral Q12H PRN    atorvastatin (LIPITOR) tablet 10 mg  10 mg Oral QHS    baricitinib (OLUMIANT) tablet 4 mg  4 mg Oral DAILY    zinc sulfate (ZINCATE) 50 mg zinc (220 mg) capsule 1 Capsule  1 Capsule Oral DAILY    ascorbic acid (vitamin C) (VITAMIN C) tablet 500 mg  500 mg Oral BID    sodium chloride (NS) flush 5-40 mL  5-40 mL IntraVENous Q8H    sodium chloride (NS) flush 5-40 mL  5-40 mL IntraVENous PRN    acetaminophen (TYLENOL) tablet 650 mg  650 mg Oral Q6H PRN    Or    acetaminophen (TYLENOL) suppository 650 mg  650 mg Rectal Q6H PRN    polyethylene glycol (MIRALAX) packet 17 g  17 g Oral DAILY PRN    ondansetron (ZOFRAN ODT) tablet 4 mg  4 mg Oral Q8H PRN    Or    ondansetron (ZOFRAN) injection 4 mg  4 mg IntraVENous Q6H PRN    dexAMETHasone (DECADRON) tablet 6 mg  6 mg Oral DAILY    enoxaparin (LOVENOX) injection 40 mg  40 mg SubCUTAneous Q12H     ______________________________________________________________________  EXPECTED LENGTH OF STAY: 5d 9h  ACTUAL LENGTH OF STAY:          3                 Merari Reynolds MD

## 2021-11-23 VITALS
DIASTOLIC BLOOD PRESSURE: 73 MMHG | OXYGEN SATURATION: 92 % | HEART RATE: 70 BPM | WEIGHT: 188.05 LBS | RESPIRATION RATE: 20 BRPM | BODY MASS INDEX: 34.61 KG/M2 | TEMPERATURE: 98.4 F | HEIGHT: 62 IN | SYSTOLIC BLOOD PRESSURE: 161 MMHG

## 2021-11-23 LAB
ALBUMIN SERPL-MCNC: 2.8 G/DL (ref 3.5–5)
ALBUMIN/GLOB SERPL: 0.8 {RATIO} (ref 1.1–2.2)
ALP SERPL-CCNC: 64 U/L (ref 45–117)
ALT SERPL-CCNC: 72 U/L (ref 12–78)
AMMONIA PLAS-SCNC: 34 UMOL/L
AMMONIA PLAS-SCNC: 42 UMOL/L
ANION GAP SERPL CALC-SCNC: 6 MMOL/L (ref 5–15)
AST SERPL-CCNC: 42 U/L (ref 15–37)
BILIRUB SERPL-MCNC: 0.3 MG/DL (ref 0.2–1)
BUN SERPL-MCNC: 10 MG/DL (ref 6–20)
BUN/CREAT SERPL: 23 (ref 12–20)
CALCIUM SERPL-MCNC: 8.9 MG/DL (ref 8.5–10.1)
CHLORIDE SERPL-SCNC: 106 MMOL/L (ref 97–108)
CO2 SERPL-SCNC: 27 MMOL/L (ref 21–32)
CREAT SERPL-MCNC: 0.44 MG/DL (ref 0.55–1.02)
ERYTHROCYTE [DISTWIDTH] IN BLOOD BY AUTOMATED COUNT: 12.3 % (ref 11.5–14.5)
GLOBULIN SER CALC-MCNC: 3.3 G/DL (ref 2–4)
GLUCOSE SERPL-MCNC: 121 MG/DL (ref 65–100)
HCT VFR BLD AUTO: 38.5 % (ref 35–47)
HGB BLD-MCNC: 12.7 G/DL (ref 11.5–16)
MCH RBC QN AUTO: 30.2 PG (ref 26–34)
MCHC RBC AUTO-ENTMCNC: 33 G/DL (ref 30–36.5)
MCV RBC AUTO: 91.4 FL (ref 80–99)
NRBC # BLD: 0 K/UL (ref 0–0.01)
NRBC BLD-RTO: 0 PER 100 WBC
PLATELET # BLD AUTO: 294 K/UL (ref 150–400)
PMV BLD AUTO: 9.7 FL (ref 8.9–12.9)
POTASSIUM SERPL-SCNC: 3.9 MMOL/L (ref 3.5–5.1)
PROT SERPL-MCNC: 6.1 G/DL (ref 6.4–8.2)
RBC # BLD AUTO: 4.21 M/UL (ref 3.8–5.2)
SODIUM SERPL-SCNC: 139 MMOL/L (ref 136–145)
WBC # BLD AUTO: 3.4 K/UL (ref 3.6–11)

## 2021-11-23 PROCEDURE — 74011250637 HC RX REV CODE- 250/637: Performed by: HOSPITALIST

## 2021-11-23 PROCEDURE — 74011250636 HC RX REV CODE- 250/636: Performed by: INTERNAL MEDICINE

## 2021-11-23 PROCEDURE — 82140 ASSAY OF AMMONIA: CPT

## 2021-11-23 PROCEDURE — 74011250637 HC RX REV CODE- 250/637: Performed by: INTERNAL MEDICINE

## 2021-11-23 PROCEDURE — 36415 COLL VENOUS BLD VENIPUNCTURE: CPT

## 2021-11-23 PROCEDURE — 80053 COMPREHEN METABOLIC PANEL: CPT

## 2021-11-23 PROCEDURE — 85027 COMPLETE CBC AUTOMATED: CPT

## 2021-11-23 PROCEDURE — 74011250636 HC RX REV CODE- 250/636: Performed by: HOSPITALIST

## 2021-11-23 RX ORDER — LACTULOSE 10 G/15ML
SOLUTION ORAL; RECTAL
Qty: 480 ML | Refills: 0 | Status: SHIPPED | OUTPATIENT
Start: 2021-11-23

## 2021-11-23 RX ORDER — HYDRALAZINE HYDROCHLORIDE 20 MG/ML
10 INJECTION INTRAMUSCULAR; INTRAVENOUS ONCE
Status: COMPLETED | OUTPATIENT
Start: 2021-11-23 | End: 2021-11-23

## 2021-11-23 RX ADMIN — ENOXAPARIN SODIUM 40 MG: 100 INJECTION SUBCUTANEOUS at 12:45

## 2021-11-23 RX ADMIN — DEXAMETHASONE 6 MG: 1 TABLET ORAL at 10:11

## 2021-11-23 RX ADMIN — ENOXAPARIN SODIUM 40 MG: 100 INJECTION SUBCUTANEOUS at 01:32

## 2021-11-23 RX ADMIN — HYDRALAZINE HYDROCHLORIDE 10 MG: 20 INJECTION INTRAMUSCULAR; INTRAVENOUS at 16:10

## 2021-11-23 RX ADMIN — ZINC SULFATE 220 MG (50 MG) CAPSULE 1 CAPSULE: CAPSULE at 10:12

## 2021-11-23 RX ADMIN — BARICITINIB 4 MG: 2 TABLET, FILM COATED ORAL at 12:45

## 2021-11-23 RX ADMIN — FLUOXETINE 40 MG: 20 CAPSULE ORAL at 10:11

## 2021-11-23 RX ADMIN — Medication 10 ML: at 05:00

## 2021-11-23 RX ADMIN — LISINOPRIL 20 MG: 10 TABLET ORAL at 10:11

## 2021-11-23 RX ADMIN — ATORVASTATIN CALCIUM 10 MG: 10 TABLET, FILM COATED ORAL at 10:11

## 2021-11-23 RX ADMIN — LACTULOSE 30 ML: 20 SOLUTION ORAL at 10:10

## 2021-11-23 RX ADMIN — Medication 10 ML: at 16:10

## 2021-11-23 RX ADMIN — OXYCODONE HYDROCHLORIDE AND ACETAMINOPHEN 500 MG: 500 TABLET ORAL at 10:11

## 2021-11-23 NOTE — PROGRESS NOTES
Pt is laying in bed with even and unlabored respirations on room air. No complaints of pain at this time. No distress noted. Pt was weaned of of oxygen. The pt had a dot assessment done at 1440. The pt sitting with no oxygen on had an oxygen saturation of 94%. Then standing and walking at 1444 the pts oxygen saturation with no oxygen was 91%. Pt was encouraged to keep the pulse oximetry on her finger for an hour after the assessment and the pt was monitored throughout the hour to make sure her oxygen saturation stayed in the 90's. The pts oxygen saturation remained in the 90's throughout the hour.  placed a consult for gastro to see the pt tomorrow. Pts daughter Nuha Engle called and was given an update in the morning. Pts other daughter Enoc Goins called wanting an update as well. Tripp Tejada spoke with Enocsienna Goins regarding the pt and only having one family member receive information. Starr's number was placed in the chart and pt requested Starr be the main family member to receive updates.  was made aware and was asked to call Enoc Goins when he had a chance. Pt was given on dise of lactulose. Pt was informed that it can cause loose bowel movements and to le the night shift RN know if she is having multiple loose bowel movements. Continuing  monitoring the pts oxygen saturation and awaiting GI to see the pt. All safety precautions are in place. Bed in low position and locked. Call bell within reach. Problem: Airway Clearance - Ineffective  Goal: Achieve or maintain patent airway  Outcome: Progressing Towards Goal     Problem: Gas Exchange - Impaired  Goal: Absence of hypoxia  Outcome: Progressing Towards Goal  Goal: Promote optimal lung function  Outcome: Progressing Towards Goal     Problem: Breathing Pattern - Ineffective  Goal: Ability to achieve and maintain a regular respiratory rate  Outcome: Progressing Towards Goal     Problem:  Body Temperature -  Risk of, Imbalanced  Goal: Ability to maintain a body temperature within defined limits  Outcome: Progressing Towards Goal  Goal: Will regain or maintain usual level of consciousness  Outcome: Progressing Towards Goal  Goal: Complications related to the disease process, condition or treatment will be avoided or minimized  Outcome: Progressing Towards Goal     Problem: Isolation Precautions - Risk of Spread of Infection  Goal: Prevent transmission of infectious organism to others  Outcome: Progressing Towards Goal     Problem: Nutrition Deficits  Goal: Optimize nutrtional status  Outcome: Progressing Towards Goal     Problem: Risk for Fluid Volume Deficit  Goal: Maintain normal heart rhythm  Outcome: Progressing Towards Goal  Goal: Maintain absence of muscle cramping  Outcome: Progressing Towards Goal  Goal: Maintain normal serum potassium, sodium, calcium, phosphorus, and pH  Outcome: Progressing Towards Goal     Problem: Loneliness or Risk for Loneliness  Goal: Demonstrate positive use of time alone when socialization is not possible  Outcome: Progressing Towards Goal     Problem: Fatigue  Goal: Verbalize increase energy and improved vitality  Outcome: Progressing Towards Goal     Problem: Patient Education: Go to Patient Education Activity  Goal: Patient/Family Education  Outcome: Progressing Towards Goal

## 2021-11-23 NOTE — PROGRESS NOTES
Attempted to schedule hospital follow up PCP appointment. Awaiting call back from the office with appointment information. Pending patient discharge. Anderson Husain, Care Management Specialist.     Received call back. Hospital follow-up PCP transitional care appointment has been scheduled with Dr. Lucila Stroud for Friday, 12/3/21 at 11:45 a.m. Pending patient discharge.   Anderson Husain, Care Management Specialist.

## 2021-11-23 NOTE — PROGRESS NOTES
I have reviewed discharge instructions with the patient. The patient verbalized understanding. New prescription to pharmacy.

## 2021-11-23 NOTE — CONSULTS
[address] [facility] NOTE  [facility]  [phone] NP in-hospital cell phone M-F until 4:30  After 5pm or on weekends, please call  for physician on call        NAME:  Leonela Meehan   :   1955   MRN:   [id number]       Referring Physician: Rod Rolon Date: 2021 10:30 AM    Chief Complaint: AMS, elevated ammonia, hepatic steatosis      History of Present Illness:  Patient is a 78 y.o. who is seen in consultation at the request of Dr. Isaac Miner for AMS, elevated ammonia, hepatic steatosis. Medical history as listed below. She presented for COVID respiratory failure. Spoke with patient over the phone due to +COVID. She had some mental fogginess which she reports occurs with steroids and has resolved. She was noted to have mild elevation in ammonia. Occasional NSAID's. Denies alcohol, tobacco, or tylenol use. Remote history of colonoscopy >10 years ago. I have reviewed the emergency room note, hospital admission note, notes by all other clinicians who have seen the patient during this hospitalization to date. I have reviewed the problem list and the reason for this hospitalization. I have reviewed the allergies and the medications the patient was taking at home prior to this hospitalization. PMH:  Past Medical History:   Diagnosis Date    Hypertension     Ill-defined condition     high cholesterol    Psychiatric disorder        PSH:  Past Surgical History:   Procedure Laterality Date    HX GYN      c section    HX OTHER SURGICAL      breast augmentation       Allergies: Allergies   Allergen Reactions    Tylenol [Acetaminophen] Other (comments)     Contraindicated d/t \"liver issue\"       Home Medications:  Prior to Admission Medications   Prescriptions Last Dose Informant Patient Reported? Taking?    FLUoxetine (PROZAC) 40 mg capsule   Yes No   Sig: Take 40 mg by mouth daily. HYDROmorphone (DILAUDID) 2 mg tablet   No No   Sig: Take 1 tablet by mouth every four (4) hours as needed for Pain.   amoxicillin-clavulanate (AUGMENTIN) 875-125 mg per tablet   Yes No   Sig: Take 1 tablet by mouth two (2) times a day. diazepam (VALIUM) 5 mg tablet   No No   Sig: Take 1 Tab by mouth every twelve (12) hours as needed (spasm). Max Daily Amount: 10 mg.   lisinopril (PRINIVIL, ZESTRIL) 10 mg tablet   Yes No   Sig: Take 10 mg by mouth daily. ondansetron hcl (ZOFRAN, AS HYDROCHLORIDE,) 4 mg tablet   No No   Sig: Take 1 tablet by mouth every eight (8) hours as needed for Nausea. oxyCODONE-acetaminophen (PERCOCET) 5-325 mg per tablet   No No   Sig: Take 1 Tab by mouth every four (4) hours as needed for Pain. Max Daily Amount: 6 Tabs. simvastatin (ZOCOR) 20 mg tablet   Yes No   Sig: Take 20 mg by mouth nightly.       Facility-Administered Medications: None       Hospital Medications:  Current Facility-Administered Medications   Medication Dose Route Frequency    FLUoxetine (PROzac) capsule 40 mg  40 mg Oral DAILY    lisinopriL (PRINIVIL, ZESTRIL) tablet 20 mg  20 mg Oral DAILY    diazePAM (VALIUM) tablet 5 mg  5 mg Oral Q12H PRN    atorvastatin (LIPITOR) tablet 10 mg  10 mg Oral QHS    baricitinib (OLUMIANT) tablet 4 mg  4 mg Oral DAILY    zinc sulfate (ZINCATE) 50 mg zinc (220 mg) capsule 1 Capsule  1 Capsule Oral DAILY    ascorbic acid (vitamin C) (VITAMIN C) tablet 500 mg  500 mg Oral BID    sodium chloride (NS) flush 5-40 mL  5-40 mL IntraVENous Q8H    sodium chloride (NS) flush 5-40 mL  5-40 mL IntraVENous PRN    acetaminophen (TYLENOL) tablet 650 mg  650 mg Oral Q6H PRN    Or    acetaminophen (TYLENOL) suppository 650 mg  650 mg Rectal Q6H PRN    polyethylene glycol (MIRALAX) packet 17 g  17 g Oral DAILY PRN    ondansetron (ZOFRAN ODT) tablet 4 mg  4 mg Oral Q8H PRN    Or    ondansetron (ZOFRAN) injection 4 mg  4 mg IntraVENous Q6H PRN    dexAMETHasone (DECADRON) tablet 6 mg  6 mg Oral DAILY    enoxaparin (LOVENOX) injection 40 mg  40 mg SubCUTAneous Q12H       Social History:  Social History     Tobacco Use    Smoking status: Current Some Day Smoker    Smokeless tobacco: Not on file   Substance Use Topics    Alcohol use: No       Family History:  History reviewed. No pertinent family history. Review of Systems:  Constitutional: negative fever, negative chills, negative weight loss  Eyes:   negative visual changes  ENT:   negative sore throat, tongue or lip swelling  Respiratory:  negative cough, negative dyspnea  Cards:  negative for chest pain, palpitations, lower extremity edema  GI:   See HPI  :  negative for frequency, dysuria  Integument:  negative for rash and pruritus  Heme:  negative for easy bruising and gum/nose bleeding  Musculoskeletal:negative for myalgias, back pain and muscle weakness  Neuro:    negative for headaches, dizziness, vertigo  Psych: negative for feelings of anxiety, depression     Objective:     Patient Vitals for the past 8 hrs:   BP Temp Pulse Resp SpO2   11/23/21 0811 138/83 98 °F (36.7 °C) 67 18 93 %   11/23/21 0242 (!) 166/84 98 °F (36.7 °C) 77  91 %     No intake/output data recorded. No intake/output data recorded. EXAM:     Deferred due to +COVID     Data Review     Recent Labs     11/23/21  0452 11/21/21  0604   WBC 3.4* 8.3   HGB 12.7 13.8   HCT 38.5 42.3    289     Recent Labs     11/23/21  0452 11/21/21  0604    134*   K 3.9 3.9    101   CO2 27 31   BUN 10 11   CREA 0.44* 0.63   * 102*   CA 8.9 8.8     Recent Labs     11/23/21 0452   AP 64   TP 6.1*   ALB 2.8*   GLOB 3.3     No results for input(s): INR, PTP, APTT, INREXT in the last 72 hours.        Assessment:     · Elevated ammonia: 36-->42  · Hepatic steatosis   · COVID-19 pneumonia     Patient Active Problem List   Diagnosis Code    Pneumonia due to COVID-19 virus U07.1, J12.82     Plan:       · Okay for discharge from GI standpoint  · Outpatient GI follow-up 4-6 weeks  · Patient discussed with Dr. Freddy Xiong  · Thank you for allowing me to participate in care of [address]     Signed By: Gayathri Lantigua NP     11/23/2021  10:30 AM       GI attending note:    Chart reviewed. Agree with note of KRISTI. Pt discharged already. No history of cirrhosis.     Dr. Freddy Xiong

## 2021-11-23 NOTE — PROGRESS NOTES
Pt pacing in room. Pt voiced feelings of frustration. Reports great anxiety relating to daughter Becca Walters is over involved in my care. \" Pt appears anxious. To be discharged and states \"I am going home where I live with my other daughter and I just want us to relax and hold each other. I have been very scared and did not think I would leave the hospital alive. \"  Verbal support and reassurance given. Assisted patient with dressing and gathering personal belongings for discharge.

## 2021-11-23 NOTE — DISCHARGE INSTRUCTIONS
Patient Education        Learning About COPD and How to Prevent Lung Infections  How do lung infections affect COPD? Lung infections like pneumonia and acute bronchitis are common causes of COPD flare-ups. And people who have COPD are more likely to get these lung infections, especially if they smoke. When you have COPD, it is important to know the symptoms of pneumonia and acute bronchitis and call your doctor if you have them. Symptoms include:  · A cough that brings up more mucus than usual.  · Fever. · Shortness of breath. What can you do to prevent these infections? Stay healthy   · If you must be around people with colds or the flu, wash your hands often. · Get the flu vaccine every year. · Get a pneumococcal vaccine shot. If you have had one before, ask your doctor whether you need another dose. Two different types of pneumococcal vaccines are recommended for people ages 72 and older. · Make sure you are current on your whooping cough (pertussis) vaccine to help prevent whooping cough. · Do not smoke. This is the most important step you can take to prevent more damage to your lungs. If you need help quitting, talk to your doctor about stop-smoking programs and medicines. These may increase your chances of quitting for good. · Avoid secondhand smoke and air pollution. Try to stay inside with your windows closed when air pollution is bad. Exercise and eat well   · If your doctor recommends it, get more exercise. Walking is a good choice. Bit by bit, increase the amount you walk every day. Try for at least 30 minutes on most days of the week. · Eat regular, well-balanced meals. Eating right keeps your energy levels up and helps your body fight infection. · Get plenty of rest and sleep. Follow-up care is a key part of your treatment and safety. Be sure to make and go to all appointments, and call your doctor if you are having problems.  It's also a good idea to know your test results and keep a list of the medicines you take. Where can you learn more? Go to http://www.gray.com/  Enter B151 in the search box to learn more about \"Learning About COPD and How to Prevent Lung Infections. \"  Current as of: July 6, 2021               Content Version: 13.0  © 3527-3632 [employer]. Care instructions adapted under license by [employer] (which disclaims liability or warranty for this information). If you have questions about a medical condition or this instruction, always ask your healthcare professional. [address] any warranty or liability for your use of this information. Patient Education        COVID-19 Viral Test: About This Test  What is it? A COVID-19 viral test is a way to find out if you have COVID-19. The test looks for the virus in your breathing passages. There are different types of viral tests. One type looks for genetic material from the virus. This is usually called polymerase chain reaction (PCR). Another type looks for proteins on the virus. This is usually called an antigen test. It may not be as accurate as PCR. Some test results come back in a few minutes. Others may take a few days. Why is it done? This test is used to diagnose a current infection with SARS-CoV-2, the virus that causes COVID-19. Knowing that you have the virus means that you can take steps to protect others from getting infected. This can help limit the spread of the virus. Knowing who has COVID-19 is also important for experts who track the virus. How do you prepare for the test?  You don't need to do anything to prepare for this test. But be sure to follow any instructions your health care provider gives you. How is it done? The test is most often done on a sample from your nose or throat. It's sometimes done on a sample of saliva. One way a sample is collected is by putting a long swab into the back of your nose.  Samples can be tested in different ways to look for an infection. What should you do while you wait for your test results? If you are being tested because you've been exposed to COVID-19 or have been sick from COVID-19, you will want to know what to do while you wait for your test results. While you wait for the results of your COVID-19 test, stay in the place where you live, and stay away from others. Do this even if you don't feel sick or have any symptoms. Don't leave unless you need medical care. If you can, try to stay in a separate room. This might help you avoid infecting family members or other people you live with. Follow your doctor's instructions about what to do when you get your results back. Be sure to wear a mask and follow social-distancing guidelines after you get your results, even if the test is negative. If you are fully vaccinated, you may not need to follow these instructions. Ask your doctor if you have questions. What do your results mean? The result is either positive or negative. A positive result means that the antigen or the genetic material of the virus was found in your sample. You have COVID-19 now. A negative result means that the antigen or the genetic material was not found. This may mean that you don't have COVID-19. But it's possible to get a \"false-negative\" result. This means that the test shows that you don't have COVID-19 when in fact you do. This may happen because you were tested too soon after you were infected, before the virus started to spread in your nose and throat. Or it could happen because the swab missed the infection. If you get a negative result for an antigen test, your doctor may recommend that you get another test, such as polymerase chain reaction (PCR), to make sure you don't have the virus. In general, PCR is more accurate than an antigen test.  Some test results come back in a few minutes. Others may take a few days.   If your test is negative, follow your doctor's advice for when you can go back to activities. If your test is positive, talk to your doctor or a public health official about what you need to do. Where can you learn more? Go to http://www.gray.com/  Enter A129 in the search box to learn more about \"COVID-19 Viral Test: About This Test.\"  Current as of: March 26, 2021               Content Version: 13.0  © 2006-2021 [employer]. Care instructions adapted under license by [employer] (which disclaims liability or warranty for this information). If you have questions about a medical condition or this instruction, always ask your healthcare professional. [place] [address] any warranty or liability for your use of this information. Patient Education        COVID-19 Antibody Test: About This Test  What is it? An antibody test looks for antibodies in the blood. These are proteins that your immune system makes, usually after you're exposed to germs like viruses or bacteria or after you get a vaccine. Antibodies work to fight illness. A COVID-19 antibody test looks for antibodies to SARS-CoV-2, the virus that causes COVID-19. If you test positive for these antibodies, it could mean that you already had COVID-19 or that you've been vaccinated for COVID-19. Why is it done? This test can be used to diagnose a past infection with the virus that causes COVID-19. Many people who get COVID-19 never have symptoms or have only mild ones. Without antibody testing, these people might never know that they already had the virus. Even if the test shows that you may have had COVID-19, you need to keep taking steps to protect yourself and others from the virus. Having COVID-19 in the past may not prevent you from getting it again. Antibody testing is important because:  · It could show who has already had COVID-19. · It could show who hasn't had the infection.   · It helps experts who are tracking COVID-19 learn more about the virus and how it spreads. Talk to your doctor about what the test results mean for you. How do you prepare for the test?  You don't need to do anything to prepare for this test. But be sure to follow any instructions your health care provider gives you. How is it done? This is a blood test. A health professional may prick your finger or use a needle to take a sample of blood from your arm. What do your results mean? The result is either positive or negative. A positive result means antibodies to SARS-CoV-2 were found. You probably already had COVID-19 or had a COVID-19 vaccine. But:  · You could get a \"false-positive\" result. The test might show that you have COVID-19 antibodies when you don't. The test may find antibodies that formed in response to another type of coronavirus. · It's not certain that having these antibodies will protect you from getting COVID-19 again. And if it does, it's not clear how long the protection lasts. A negative result means that these antibodies were not found. · You could get a \"false-negative\" result. It takes a while after you're infected or vaccinated for your immune system to make antibodies. · You could have a negative result but be infected now. You'd need a different test (viral test) to know if you have COVID-19 now. Where can you learn more? Go to http://www.gray.com/  Enter A128 in the search box to learn more about \"COVID-19 Antibody Test: About This Test.\"  Current as of: March 26, 2021               Content Version: 13.0  © 0065-6303 [employer]. Care instructions adapted under license by [employer] (which disclaims liability or warranty for this information). If you have questions about a medical condition or this instruction, always ask your healthcare professional. [address] any warranty or liability for your use of this information.          Patient Education        Learning About How to Wear a Mask  What's a mask, and why wear one? Wearing a mask is an easy way to help stop the spread of COVID-19. The virus spreads mainly through droplets that escape when you breathe, talk, cough, or sneeze. A mask blocks the droplets. Wearing a mask protects both you and others. A mask should cover your nose and mouth without gaps at the sides. You should be breathing through it, not around it. Masks are easy to find. Many stores have them, and you can make your own. Your mask should have a few layers of fabric that still let you breathe easily. Avoid masks with valves. Most of them don't prevent the spread of COVID-19. They filter out dust you breathe in, but they don't filter germs you breathe out. If you aren't fully vaccinated, experts agree that everyone over age 2 should wear a mask, unless told not to by a doctor. It's especially important to wear one if you can't do social distancing--staying at least 6 feet apart from people you don't live with. How do you wear one properly? Wash your hands. Wash your hands well with soap and water. Scrub for at least 20 seconds. If soap and water aren't available, use an alcohol-based hand . Put the mask on. Cover your nose and mouth with the mask. Secure it under your chin. Get a snug fit on both sides of your face. Loop it around your ears or tie it behind your head. Make sure you can breathe easily. If you wear glasses, put them on over the top of the mask at the bridge of your nose. This will help prevent fogging. Take the mask off. Untie the strings behind your head, or unloop the mask from your ears. Handle it only by the loops or ties. Fold the outside corners together. The outside of the mask is now folded in on itself. Wash the mask or throw it away. If the mask is washable, throw it in the washing machine and wash it in the warmest water possible for the type of cloth.  If the mask is disposable, throw it in the trash. Then wash your hands with soap and water. Where can you learn more? Go to http://www.gray.com/  Enter A139 in the search box to learn more about \"Learning About How to Wear a Mask. \"  Current as of: July 1, 2021               Content Version: 13.0  © 6978-6879 Healthwise, [facility]. Care instructions adapted under license by [employer] (which disclaims liability or warranty for this information). If you have questions about a medical condition or this instruction, always ask your healthcare professional. [place] [address] any warranty or liability for your use of this information.

## 2021-11-23 NOTE — PROGRESS NOTES
AMAIRANI; anticipate d/c home with daughter; Isaiasramírez to follow up with patient as an OP; Follow up with PCP & Specialist; Pt is Covid positive; Daughter transport    Pt has been weaned off 18. RUR: 5%    -1530-CM reviewed pt chart & discussed pt case in IDRs. As per report pt can d/c home today pending BP stability and ammonia result. CM sent Perfect Serve Message to Attending and was advised that he would like to monitor pt's BP and would order medication for BP. CM informed Nurse of this. Daughter to provide d/c transport, no other CM d/c needs anticipated at this time. Parminder Shetty RN BSN CCM Medicare pt has received, reviewed, and signed 2nd IM letter informing them of their right to appeal the discharge. Signed copy has been placed on pt bedside chart.

## 2021-11-24 ENCOUNTER — PATIENT OUTREACH (OUTPATIENT)
Dept: CASE MANAGEMENT | Age: 66
End: 2021-11-24

## 2021-11-24 NOTE — ACP (ADVANCE CARE PLANNING)
Advance Care Planning:   Does patient have an Advance Directive: not on file; education provided. Will send info and advance directive form for review once @[employer] account set up.

## 2021-11-24 NOTE — DISCHARGE SUMMARY
.     Discharge Summary       PATIENT ID: Carlitos Burk  MRN: [id number]   YOB: 1955    DATE OF ADMISSION: 11/19/2021  8:34 PM    DATE OF DISCHARGE: 11/23/2021  PRIMARY CARE PROVIDER: Raj Tomas MD     ATTENDING PHYSICIAN: Tim Arias MD  DISCHARGING PROVIDER: Tim Arias MD    To contact this individual call [phone] and ask the  to page. If unavailable ask to be transferred the Adult Hospitalist Department. CONSULTATIONS: IP CONSULT TO GASTROENTEROLOGY    PROCEDURES/SURGERIES: * No surgery found *    ADMITTING DIAGNOSES & HOSPITAL COURSE:   \"72 y.o lady w/ HTN and remote tobacco use who presents with shortness of breath. She was diagnosed with COVID-19 one week ago, was doing better but suddenly began feeling worse. She describes progressive dyspnea, generalized headaches, subjective fever and body aches. She denies diarrhea, has occasional nausea and vomited once. She took ibuprofen without much relief of her symptoms. \"        DISCHARGE DIAGNOSES / PLAN:      Acute respiratory failure with hypoxia    COVID-19 pneumonia    Acute metabolic encephalopathy    Hypertension    Hepatic steatosis    Obesity BMI 34.4    78 y.o lady w/ HTN and remote tobacco use who presents with shortness of breath. She was diagnosed with COVID-19 one week ago, was doing better but suddenly began feeling worse. She was admitted to the hospital, started on guideline guided COVID protocol, patient has significant improvement of her hypoxia, she was evaluated for home oxygen therapy however her oxygen remained WNL on ambulation. On 11/21/2020 when she was followed by Dr. Rashida Bermudez and as per his note \" she is up and walking in the room. When asked specifically what is wrong, she tells me that she feels hot and that she is tired. She denies SOB. She also admits that she gets a little confused and loses her \"train of thought\" while telling a story.   While speaking, she threw in a non-word that sounded like what she was talking about -- she said \"mimosy. \" I repeated the word in a questioning manner and she jokingly said \"isn't that a drink? \" She does easily lose her concentration. However, she remembered my name.-\"     Ammonia level was checked which was mildly elevated, CT chest for COVID-19 pneumonia showed incidental mild hepatic steatosis. On my encounter the following day patient appeared to be very pleasant, awake alert and oriented x3, just very anxious to be discharged home. She stated that she does remember that she was mildly confused and thinks it was because of COVID-19 and possibly due to steroid that she received for COVID-19. Patient was given 1 dose of p.o. lactulose with mild improvement of her ammonia level. He was also counseled on diet and lifestyle modification to help lose weight. Gastroenterology was consulted for evaluation of her hepatic steatosis and they recommended outpatient follow-up (please refer to gastroenterology note). Patient was discharged on lactulose p.o. and extensively advised on how to take lactulose to lower her ammonia and was encouraged to follow-up with gastroenterology for further evaluation and work-up of her hepatic steatosis. Of note review of her home medication showed that patient was on Percocet, Dilaudid at diazepam, when I discussed this with the patient she stated that she does not take Percocet or Dilaudid but she takes diazepam for her spasms. Patient was advised to avoid benzodiazepines if possible given her history of hepatic acidosis and hyperammonemia and follow-up with PCP to be prescribed nonbenzodiazepine muscle relaxants.          PENDING TEST RESULTS:   At the time of discharge the following test results are still pending: None    FOLLOW UP APPOINTMENTS:    Follow-up Information     Follow up With Specialties Details Why Contact Info    Gui Oneal MD Family Medicine On 12/3/2021 Hospital follow up PCP appointment Friday, 12/3/21 at 11:45 a.m. [address] [facility]  [address] [address]  [phone], [address] [facility], NP Gastroenterology In 2 weeks  [place] [address]  [phone]             ADDITIONAL CARE RECOMMENDATIONS: PCP and gastroenterology follow-up    DIET: Cardiac Diet    ACTIVITY: Activity as tolerated    DISCHARGE MEDICATIONS:  Discharge Medication List as of 11/23/2021  4:09 PM      START taking these medications    Details   lactulose (CHRONULAC) 10 gram/15 mL solution Please take 10 g up to 3 times daily to have 2-3 soft bowel movements in 24 hours, please do not take if you are having diarrhea or loose stools. .  Indications: high amount of ammonium in the blood, Normal, Disp-480 mL, R-0, ABIGAIL         CONTINUE these medications which have NOT CHANGED    Details   FLUoxetine (PROZAC) 40 mg capsule Take 40 mg by mouth daily. , Historical Med      lisinopril (PRINIVIL, ZESTRIL) 10 mg tablet Take 10 mg by mouth daily. , Historical Med      simvastatin (ZOCOR) 20 mg tablet Take 20 mg by mouth nightly., Historical Med      ondansetron hcl (ZOFRAN, AS HYDROCHLORIDE,) 4 mg tablet Take 1 tablet by mouth every eight (8) hours as needed for Nausea. , Print, Disp-20 tablet, R-0         STOP taking these medications       oxyCODONE-acetaminophen (PERCOCET) 5-325 mg per tablet Comments:   Reason for Stopping:         diazepam (VALIUM) 5 mg tablet Comments:   Reason for Stopping:         amoxicillin-clavulanate (AUGMENTIN) 875-125 mg per tablet Comments:   Reason for Stopping:         HYDROmorphone (DILAUDID) 2 mg tablet Comments:   Reason for Stopping:                 NOTIFY YOUR PHYSICIAN FOR ANY OF THE FOLLOWING:   Fever over 101 degrees for 24 hours. Chest pain, shortness of breath, fever, chills, nausea, vomiting, diarrhea, change in mentation, falling, weakness, bleeding. Severe pain or pain not relieved by medications.   Or, any other signs or symptoms that you may have questions about.    DISPOSITION:    Home With:   OT  PT  HH  RN       Long term SNF/Inpatient Rehab    Independent/assisted living    Hospice    Other:       PATIENT CONDITION AT DISCHARGE:     Functional status    Poor     Deconditioned     Independent      Cognition     Lucid     Forgetful     Dementia      Catheters/lines (plus indication)    [place]     PICC     PEG     None      Code status     Full code     DNR      PHYSICAL EXAMINATION AT DISCHARGE:   Refer to Progress Note while inpatient      CHRONIC MEDICAL DIAGNOSES:  Problem List as of 11/23/2021 Never Reviewed          Codes Class Noted - Resolved    Pneumonia due to COVID-19 virus ICD-10-CM: U07.1, J12.82  ICD-9-CM: 480.8, 079.89  11/19/2021 - Present              Greater than 30 minutes were spent with the patient on counseling and coordination of care    Signed:   Patricia Dean MD  11/24/2021  2:53 PM

## 2021-11-24 NOTE — PROGRESS NOTES
Patient contacted regarding COVID-19 diagnosis. Discussed COVID-19 related testing which was not done at this time. Test results were not done. Patient informed of results, if available? n/a. Care Transition Nurse contacted the patient by telephone to perform post discharge assessment. Call within 2 business days of discharge: Yes Verified name and  with patient as identifiers. Provided introduction to self, and explanation of the CTN/ACM role, and reason for call due to risk factors for infection and/or exposure to COVID-19. Symptoms reviewed with patient who verbalized the following symptoms: no new symptoms and no worsening symptoms      Due to no new or worsening symptoms encounter was not routed to provider for escalation. Discussed follow-up appointments. If no appointment was previously scheduled, appointment scheduling offered:  n/a. [facility] follow up appointment(s): No future appointments. [facility](s):   PCP- Dr. [email or web address]  Eden Coleman NP in 2 weeks, daughter will call and schedule. Interventions to address risk factors: Scheduled appointment with PCP-12/3 and Obtained and reviewed discharge summary and/or continuity of care documents     Advance Care Planning:   Does patient have an Advance Directive: not on file; education provided. Will send info once [employer] account set up. Educated patient about risk for severe COVID-19 due to risk factors according to CDC guidelines. CTN reviewed discharge instructions, medical action plan and red flag symptoms with the patient who verbalized understanding. Discussed COVID vaccination status: yes. Education provided on COVID-19 vaccination as appropriate. Discussed exposure protocols and quarantine with CDC Guidelines. Patient was given an opportunity to verbalize any questions and concerns and agrees to contact CTN or health care provider for questions related to their healthcare.     Reviewed and educated patient/family on any new and changed medications related to discharge diagnosis     Was patient discharged with a pulse oximeter? No  Patient has pulse ox available at home. Goals      Prevent complications post hospitalization. 11/24/21   Attend follow up appts as directed-  [facility] follow up appointment(s): No future appointments. [facility] follow up appointment(s):   PCP- [email or web address]. [email or web address]  Freddie Melgoza NP in 2 weeks, daughter will call and schedule.  pulse ox to monitor oxygen sats. Sats this am 95%.  Medication compliance- lactulose monitor for 2-3 soft stools a day. Will stop taking if having diarrhea or loose stools.  Activity- activity intolerance/energy conservation/frequent rest. Reviewed purse lip breathing techniques with daughter, Ama Mckenzie.  Patient will monitor/report red flags- increased SOB, wheezing, worsening cough, excess phlegm/mucus, increased fatigue, CP, fever greater than 101 that does not respond to fever reducers, N/V/D-s/s dehydration/reduced po intake. [address] contact information (phone- [phone]) offered as resource. Patient/family have used in the past have information available. CTN provided contact information. Plan for follow-up call in 5-7 days based on severity of symptoms and risk factors.

## 2021-12-01 ENCOUNTER — PATIENT OUTREACH (OUTPATIENT)
Dept: CASE MANAGEMENT | Age: 66
End: 2021-12-01

## 2021-12-01 NOTE — PROGRESS NOTES
Care Transitions follow up outreach attempt    Attempted to reach patient for follow up. Unable to reach patient. [facility]    Patient: Patience Oliver Patient : 1955 MRN: [id number]    Last Discharge [facility]       Complaint Diagnosis Description Type Department Provider    21 Shortness of Breath; Positive For Covid-19 COVID . Flo Vidales . ED to Hosp-Admission (Discharged) (ADMIT) Rocky Rodriguez MD; Melany Villalta. .. Noted following upcoming appointments from discharge chart review:   [facility] follow up appointment(s): No future appointments.   [facility] follow [facility] appointment(s):   PCP- [email or web address]. [email or web address]

## 2021-12-09 ENCOUNTER — PATIENT OUTREACH (OUTPATIENT)
Dept: CASE MANAGEMENT | Age: 66
End: 2021-12-09

## 2021-12-09 NOTE — PROGRESS NOTES
COVID Care Transitions Follow Up Call    Care Transition Nurse (CTN) contacted the patient by telephone to follow up after admission on -. Verified name and  with patient as identifiers. Addressed changes since last contact: attended PCP follow up  Follow up appointment completed? yes. Was follow up appointment scheduled within 7 days of discharge? no.     [address] follow up appointment(s): No future appointments. Non-[facility] follow up appointment(s):   GI follow up to assess evaluation of hepatic steatosis set for 2022. CTN provided contact information for future needs. No further follow-up call indicated based on severity of symptoms and risk factors. Goals Addressed                 This Visit's Progress     Prevent complications post hospitalization. 21   Attend follow up appts as directed-  [address] follow up appointment(s): No future appointments. Non-[facility] follow up appointment(s):   PCP- Dr. [email or web address]  Benedict Britt NP in 2 weeks, daughter will call and schedule.  pulse ox to monitor oxygen sats. Sats this am 95%.  Medication compliance- lactulose monitor for 2-3 soft stools a day. Will stop taking if having diarrhea or loose stools.  Activity- activity intolerance/energy conservation/frequent rest. Reviewed purse lip breathing techniques with daughter, Gianni Pond.  Patient will monitor/report red flags- increased SOB, wheezing, worsening cough, excess phlegm/mucus, increased fatigue, CP, fever greater than 101 that does not respond to fever reducers, N/V/D-s/s dehydration/reduced po intake. [address] contact information (phone- [phone]) offered as resource. Patient/family have used in the past have information available. 21   Non-[facility] follow up appointment(s):   GI follow up to assess evaluation of hepatic steatosis set for 2022.  Patient reports feeling back to BL. Oxygen levels are consistently between 93-95% on RA.  She denies any current symptoms.

## 2021-12-23 ENCOUNTER — PATIENT OUTREACH (OUTPATIENT)
Dept: CASE MANAGEMENT | Age: 66
End: 2021-12-23

## 2021-12-23 NOTE — PROGRESS NOTES
Patient has graduated from the Transitions of Care Coordination  program on 12/23/2021. Patient/family has the ability to self-manage at this time Care management goals have been completed. [facility] for further management. Goals Addressed                 This Visit's Progress     COMPLETED: Prevent complications post hospitalization. 11/24/21   Attend follow up appts as directed-  [facility] follow up appointment(s): No future appointments. Non-[facility] follow up appointment(s):   PCP- Dr. [email or web address]  [email or web address] Sandhoff, NP in 2 weeks, daughter will call and schedule.  pulse ox to monitor oxygen sats. Sats this am 95%.  Medication compliance- lactulose monitor for 2-3 soft stools a day. Will stop taking if having diarrhea or loose stools.  Activity- activity intolerance/energy conservation/frequent rest. Reviewed purse lip breathing techniques with daughter, Olinda Moore.  Patient will monitor/report red flags- increased SOB, wheezing, worsening cough, excess phlegm/mucus, increased fatigue, CP, fever greater than 101 that does not respond to fever reducers, N/V/D-s/s dehydration/reduced po intake. [address] contact information (phone- [phone]) offered as resource. Patient/family have used in the past have information available. 12/09/21   Non-[facility] follow up appointment(s):   GI follow up to assess evaluation of hepatic steatosis set for 1/2022.  Patient reports feeling back to BL. Oxygen levels are consistently between 93-95% on RA. She denies any current symptoms. Patient has Care Transition Nurse's contact information for any further questions, concerns, or needs. Patients upcoming visits:  No future appointments.

## 2022-03-20 PROBLEM — U07.1 PNEUMONIA DUE TO COVID-19 VIRUS: Status: ACTIVE | Noted: 2021-11-19

## 2022-03-20 PROBLEM — J12.82 PNEUMONIA DUE TO COVID-19 VIRUS: Status: ACTIVE | Noted: 2021-11-19

## 2023-05-11 RX ORDER — LISINOPRIL 10 MG/1
10 TABLET ORAL DAILY
COMMUNITY

## 2023-05-11 RX ORDER — SIMVASTATIN 20 MG
20 TABLET ORAL NIGHTLY
COMMUNITY

## 2023-05-11 RX ORDER — ONDANSETRON 4 MG/1
TABLET, FILM COATED ORAL EVERY 8 HOURS PRN
COMMUNITY
Start: 2015-02-05

## 2023-05-11 RX ORDER — LACTULOSE 10 G/15ML
SOLUTION ORAL
COMMUNITY
Start: 2021-11-23

## 2023-05-11 RX ORDER — FLUOXETINE HYDROCHLORIDE 40 MG/1
40 CAPSULE ORAL DAILY
COMMUNITY

## 2024-12-24 ENCOUNTER — HOSPITAL ENCOUNTER (INPATIENT)
Facility: HOSPITAL | Age: 69
LOS: 1 days | Discharge: HOME | DRG: 194 | End: 2024-12-26
Attending: STUDENT IN AN ORGANIZED HEALTH CARE EDUCATION/TRAINING PROGRAM | Admitting: STUDENT IN AN ORGANIZED HEALTH CARE EDUCATION/TRAINING PROGRAM
Payer: MEDICARE

## 2024-12-24 ENCOUNTER — APPOINTMENT (OUTPATIENT)
Facility: HOSPITAL | Age: 69
DRG: 194 | End: 2024-12-24
Payer: MEDICARE

## 2024-12-24 DIAGNOSIS — J10.1 INFLUENZA A: Primary | ICD-10-CM

## 2024-12-24 DIAGNOSIS — R09.02 HYPOXIA: ICD-10-CM

## 2024-12-24 LAB
ALBUMIN SERPL-MCNC: 3.5 G/DL (ref 3.5–5)
ALBUMIN/GLOB SERPL: 0.9 (ref 1.1–2.2)
ALP SERPL-CCNC: 72 U/L (ref 45–117)
ALT SERPL-CCNC: 58 U/L (ref 12–78)
ANION GAP SERPL CALC-SCNC: 2 MMOL/L (ref 2–12)
AST SERPL-CCNC: 75 U/L (ref 15–37)
BASOPHILS # BLD: 0.1 K/UL (ref 0–0.1)
BASOPHILS NFR BLD: 1 % (ref 0–1)
BILIRUB SERPL-MCNC: 0.3 MG/DL (ref 0.2–1)
BUN SERPL-MCNC: 16 MG/DL (ref 6–20)
BUN/CREAT SERPL: 22 (ref 12–20)
CALCIUM SERPL-MCNC: 9.3 MG/DL (ref 8.5–10.1)
CHLORIDE SERPL-SCNC: 101 MMOL/L (ref 97–108)
CO2 SERPL-SCNC: 29 MMOL/L (ref 21–32)
CREAT SERPL-MCNC: 0.74 MG/DL (ref 0.55–1.02)
DIFFERENTIAL METHOD BLD: ABNORMAL
EOSINOPHIL # BLD: 0.1 K/UL (ref 0–0.4)
EOSINOPHIL NFR BLD: 2 % (ref 0–7)
ERYTHROCYTE [DISTWIDTH] IN BLOOD BY AUTOMATED COUNT: 12.4 % (ref 11.5–14.5)
FLUAV RNA SPEC QL NAA+PROBE: DETECTED
FLUBV RNA SPEC QL NAA+PROBE: NOT DETECTED
GLOBULIN SER CALC-MCNC: 3.7 G/DL (ref 2–4)
GLUCOSE SERPL-MCNC: 185 MG/DL (ref 65–100)
HCT VFR BLD AUTO: 40 % (ref 35–47)
HGB BLD-MCNC: 13.7 G/DL (ref 11.5–16)
IMM GRANULOCYTES # BLD AUTO: 0 K/UL (ref 0–0.04)
IMM GRANULOCYTES NFR BLD AUTO: 0 % (ref 0–0.5)
LYMPHOCYTES # BLD: 0.6 K/UL (ref 0.8–3.5)
LYMPHOCYTES NFR BLD: 13 % (ref 12–49)
MCH RBC QN AUTO: 31 PG (ref 26–34)
MCHC RBC AUTO-ENTMCNC: 34.3 G/DL (ref 30–36.5)
MCV RBC AUTO: 90.5 FL (ref 80–99)
MONOCYTES # BLD: 0.6 K/UL (ref 0–1)
MONOCYTES NFR BLD: 12 % (ref 5–13)
NEUTS SEG # BLD: 3.5 K/UL (ref 1.8–8)
NEUTS SEG NFR BLD: 72 % (ref 32–75)
NRBC # BLD: 0 K/UL (ref 0–0.01)
NRBC BLD-RTO: 0 PER 100 WBC
PLATELET # BLD AUTO: 295 K/UL (ref 150–400)
PMV BLD AUTO: 10.7 FL (ref 8.9–12.9)
POTASSIUM SERPL-SCNC: 4.5 MMOL/L (ref 3.5–5.1)
PROT SERPL-MCNC: 7.2 G/DL (ref 6.4–8.2)
RBC # BLD AUTO: 4.42 M/UL (ref 3.8–5.2)
RBC MORPH BLD: ABNORMAL
SARS-COV-2 RNA RESP QL NAA+PROBE: NOT DETECTED
SODIUM SERPL-SCNC: 132 MMOL/L (ref 136–145)
SOURCE: ABNORMAL
WBC # BLD AUTO: 4.9 K/UL (ref 3.6–11)

## 2024-12-24 PROCEDURE — 99285 EMERGENCY DEPT VISIT HI MDM: CPT

## 2024-12-24 PROCEDURE — 80053 COMPREHEN METABOLIC PANEL: CPT

## 2024-12-24 PROCEDURE — 6370000000 HC RX 637 (ALT 250 FOR IP)

## 2024-12-24 PROCEDURE — 2700000000 HC OXYGEN THERAPY PER DAY

## 2024-12-24 PROCEDURE — 85025 COMPLETE CBC W/AUTO DIFF WBC: CPT

## 2024-12-24 PROCEDURE — 84484 ASSAY OF TROPONIN QUANT: CPT

## 2024-12-24 PROCEDURE — 94640 AIRWAY INHALATION TREATMENT: CPT

## 2024-12-24 PROCEDURE — 96374 THER/PROPH/DIAG INJ IV PUSH: CPT

## 2024-12-24 PROCEDURE — 87636 SARSCOV2 & INF A&B AMP PRB: CPT

## 2024-12-24 PROCEDURE — 36415 COLL VENOUS BLD VENIPUNCTURE: CPT

## 2024-12-24 PROCEDURE — 93005 ELECTROCARDIOGRAM TRACING: CPT | Performed by: STUDENT IN AN ORGANIZED HEALTH CARE EDUCATION/TRAINING PROGRAM

## 2024-12-24 PROCEDURE — 71046 X-RAY EXAM CHEST 2 VIEWS: CPT

## 2024-12-24 PROCEDURE — 6360000002 HC RX W HCPCS

## 2024-12-24 RX ORDER — DEXAMETHASONE SODIUM PHOSPHATE 10 MG/ML
10 INJECTION, SOLUTION INTRAMUSCULAR; INTRAVENOUS
Status: COMPLETED | OUTPATIENT
Start: 2024-12-24 | End: 2024-12-24

## 2024-12-24 RX ORDER — IPRATROPIUM BROMIDE AND ALBUTEROL SULFATE 2.5; .5 MG/3ML; MG/3ML
3 SOLUTION RESPIRATORY (INHALATION)
Status: COMPLETED | OUTPATIENT
Start: 2024-12-24 | End: 2024-12-24

## 2024-12-24 RX ORDER — KETOROLAC TROMETHAMINE 30 MG/ML
15 INJECTION, SOLUTION INTRAMUSCULAR; INTRAVENOUS
Status: COMPLETED | OUTPATIENT
Start: 2024-12-24 | End: 2024-12-24

## 2024-12-24 RX ADMIN — DEXAMETHASONE SODIUM PHOSPHATE 10 MG: 10 INJECTION, SOLUTION INTRAMUSCULAR; INTRAVENOUS at 23:48

## 2024-12-24 RX ADMIN — IPRATROPIUM BROMIDE AND ALBUTEROL SULFATE 3 DOSE: 2.5; .5 SOLUTION RESPIRATORY (INHALATION) at 23:30

## 2024-12-24 RX ADMIN — KETOROLAC TROMETHAMINE 15 MG: 30 INJECTION, SOLUTION INTRAMUSCULAR at 23:51

## 2024-12-24 ASSESSMENT — PAIN SCALES - GENERAL
PAINLEVEL_OUTOF10: 8
PAINLEVEL_OUTOF10: 0

## 2024-12-25 PROBLEM — J11.1 INFLUENZA: Status: ACTIVE | Noted: 2024-12-25

## 2024-12-25 LAB
ALBUMIN SERPL-MCNC: 3.2 G/DL (ref 3.5–5)
ALBUMIN/GLOB SERPL: 0.9 (ref 1.1–2.2)
ALP SERPL-CCNC: 68 U/L (ref 45–117)
ALT SERPL-CCNC: 51 U/L (ref 12–78)
ANION GAP SERPL CALC-SCNC: 6 MMOL/L (ref 2–12)
AST SERPL-CCNC: 47 U/L (ref 15–37)
BASOPHILS # BLD: 0 K/UL (ref 0–0.1)
BASOPHILS NFR BLD: 1 % (ref 0–1)
BILIRUB SERPL-MCNC: 0.5 MG/DL (ref 0.2–1)
BUN SERPL-MCNC: 13 MG/DL (ref 6–20)
BUN/CREAT SERPL: 16 (ref 12–20)
CALCIUM SERPL-MCNC: 8.4 MG/DL (ref 8.5–10.1)
CHLORIDE SERPL-SCNC: 100 MMOL/L (ref 97–108)
CO2 SERPL-SCNC: 26 MMOL/L (ref 21–32)
CREAT SERPL-MCNC: 0.83 MG/DL (ref 0.55–1.02)
DIFFERENTIAL METHOD BLD: ABNORMAL
EOSINOPHIL # BLD: 0 K/UL (ref 0–0.4)
EOSINOPHIL NFR BLD: 0 % (ref 0–7)
ERYTHROCYTE [DISTWIDTH] IN BLOOD BY AUTOMATED COUNT: 12.2 % (ref 11.5–14.5)
GLOBULIN SER CALC-MCNC: 3.4 G/DL (ref 2–4)
GLUCOSE SERPL-MCNC: 290 MG/DL (ref 65–100)
HCT VFR BLD AUTO: 38.6 % (ref 35–47)
HGB BLD-MCNC: 13.1 G/DL (ref 11.5–16)
IMM GRANULOCYTES # BLD AUTO: 0 K/UL (ref 0–0.04)
IMM GRANULOCYTES NFR BLD AUTO: 1 % (ref 0–0.5)
LYMPHOCYTES # BLD: 0.3 K/UL (ref 0.8–3.5)
LYMPHOCYTES NFR BLD: 6 % (ref 12–49)
MAGNESIUM SERPL-MCNC: 1.9 MG/DL (ref 1.6–2.4)
MCH RBC QN AUTO: 31.1 PG (ref 26–34)
MCHC RBC AUTO-ENTMCNC: 33.9 G/DL (ref 30–36.5)
MCV RBC AUTO: 91.7 FL (ref 80–99)
MONOCYTES # BLD: 0.1 K/UL (ref 0–1)
MONOCYTES NFR BLD: 2 % (ref 5–13)
NEUTS SEG # BLD: 3.9 K/UL (ref 1.8–8)
NEUTS SEG NFR BLD: 90 % (ref 32–75)
NRBC # BLD: 0 K/UL (ref 0–0.01)
NRBC BLD-RTO: 0 PER 100 WBC
PLATELET # BLD AUTO: 231 K/UL (ref 150–400)
PMV BLD AUTO: 10 FL (ref 8.9–12.9)
POTASSIUM SERPL-SCNC: 3.4 MMOL/L (ref 3.5–5.1)
PROT SERPL-MCNC: 6.6 G/DL (ref 6.4–8.2)
RBC # BLD AUTO: 4.21 M/UL (ref 3.8–5.2)
RBC MORPH BLD: ABNORMAL
SODIUM SERPL-SCNC: 132 MMOL/L (ref 136–145)
TROPONIN I SERPL HS-MCNC: 7 NG/L (ref 0–51)
TROPONIN I SERPL HS-MCNC: 8 NG/L (ref 0–51)
WBC # BLD AUTO: 4.3 K/UL (ref 3.6–11)

## 2024-12-25 PROCEDURE — 6370000000 HC RX 637 (ALT 250 FOR IP): Performed by: STUDENT IN AN ORGANIZED HEALTH CARE EDUCATION/TRAINING PROGRAM

## 2024-12-25 PROCEDURE — 1100000003 HC PRIVATE W/ TELEMETRY

## 2024-12-25 PROCEDURE — 80053 COMPREHEN METABOLIC PANEL: CPT

## 2024-12-25 PROCEDURE — 2500000003 HC RX 250 WO HCPCS: Performed by: STUDENT IN AN ORGANIZED HEALTH CARE EDUCATION/TRAINING PROGRAM

## 2024-12-25 PROCEDURE — 6370000000 HC RX 637 (ALT 250 FOR IP): Performed by: INTERNAL MEDICINE

## 2024-12-25 PROCEDURE — 83735 ASSAY OF MAGNESIUM: CPT

## 2024-12-25 PROCEDURE — 2580000003 HC RX 258

## 2024-12-25 PROCEDURE — 84484 ASSAY OF TROPONIN QUANT: CPT

## 2024-12-25 PROCEDURE — 36415 COLL VENOUS BLD VENIPUNCTURE: CPT

## 2024-12-25 PROCEDURE — 2700000000 HC OXYGEN THERAPY PER DAY

## 2024-12-25 PROCEDURE — 85025 COMPLETE CBC W/AUTO DIFF WBC: CPT

## 2024-12-25 PROCEDURE — 6360000002 HC RX W HCPCS: Performed by: STUDENT IN AN ORGANIZED HEALTH CARE EDUCATION/TRAINING PROGRAM

## 2024-12-25 PROCEDURE — 96375 TX/PRO/DX INJ NEW DRUG ADDON: CPT

## 2024-12-25 PROCEDURE — 6360000002 HC RX W HCPCS

## 2024-12-25 PROCEDURE — 6370000000 HC RX 637 (ALT 250 FOR IP)

## 2024-12-25 RX ORDER — IBUPROFEN 600 MG/1
600 TABLET, FILM COATED ORAL 3 TIMES DAILY PRN
Qty: 30 TABLET | Refills: 0 | Status: SHIPPED | OUTPATIENT
Start: 2024-12-25 | End: 2024-12-25

## 2024-12-25 RX ORDER — SODIUM CHLORIDE 0.9 % (FLUSH) 0.9 %
5-40 SYRINGE (ML) INJECTION EVERY 12 HOURS SCHEDULED
Status: DISCONTINUED | OUTPATIENT
Start: 2024-12-25 | End: 2024-12-26 | Stop reason: HOSPADM

## 2024-12-25 RX ORDER — PREDNISONE 20 MG/1
40 TABLET ORAL DAILY
Qty: 10 TABLET | Refills: 0 | Status: SHIPPED | OUTPATIENT
Start: 2024-12-25 | End: 2024-12-25

## 2024-12-25 RX ORDER — OSELTAMIVIR PHOSPHATE 75 MG/1
75 CAPSULE ORAL 2 TIMES DAILY
Status: DISCONTINUED | OUTPATIENT
Start: 2024-12-25 | End: 2024-12-26 | Stop reason: HOSPADM

## 2024-12-25 RX ORDER — LORAZEPAM 2 MG/ML
0.5 INJECTION INTRAMUSCULAR ONCE
Status: COMPLETED | OUTPATIENT
Start: 2024-12-25 | End: 2024-12-25

## 2024-12-25 RX ORDER — ONDANSETRON 2 MG/ML
4 INJECTION INTRAMUSCULAR; INTRAVENOUS EVERY 6 HOURS PRN
Status: DISCONTINUED | OUTPATIENT
Start: 2024-12-25 | End: 2024-12-26 | Stop reason: HOSPADM

## 2024-12-25 RX ORDER — IBUPROFEN 600 MG/1
600 TABLET, FILM COATED ORAL EVERY 6 HOURS PRN
Status: DISCONTINUED | OUTPATIENT
Start: 2024-12-25 | End: 2024-12-25

## 2024-12-25 RX ORDER — ATORVASTATIN CALCIUM 10 MG/1
10 TABLET, FILM COATED ORAL DAILY
Status: DISCONTINUED | OUTPATIENT
Start: 2024-12-25 | End: 2024-12-26 | Stop reason: HOSPADM

## 2024-12-25 RX ORDER — DEXTROMETHORPHAN HYDROBROMIDE AND PROMETHAZINE HYDROCHLORIDE 15; 6.25 MG/5ML; MG/5ML
5 SYRUP ORAL 4 TIMES DAILY PRN
Qty: 180 ML | Refills: 0 | Status: SHIPPED | OUTPATIENT
Start: 2024-12-25 | End: 2024-12-25

## 2024-12-25 RX ORDER — ALBUTEROL SULFATE 90 UG/1
2 INHALANT RESPIRATORY (INHALATION) EVERY 4 HOURS PRN
Status: DISCONTINUED | OUTPATIENT
Start: 2024-12-25 | End: 2024-12-25

## 2024-12-25 RX ORDER — OSELTAMIVIR PHOSPHATE 75 MG/1
75 CAPSULE ORAL
Status: COMPLETED | OUTPATIENT
Start: 2024-12-25 | End: 2024-12-25

## 2024-12-25 RX ORDER — IPRATROPIUM BROMIDE AND ALBUTEROL SULFATE 2.5; .5 MG/3ML; MG/3ML
1 SOLUTION RESPIRATORY (INHALATION) EVERY 4 HOURS PRN
Status: DISCONTINUED | OUTPATIENT
Start: 2024-12-25 | End: 2024-12-26 | Stop reason: HOSPADM

## 2024-12-25 RX ORDER — SODIUM CHLORIDE 9 MG/ML
INJECTION, SOLUTION INTRAVENOUS PRN
Status: DISCONTINUED | OUTPATIENT
Start: 2024-12-25 | End: 2024-12-26 | Stop reason: HOSPADM

## 2024-12-25 RX ORDER — GUAIFENESIN 600 MG/1
600 TABLET, EXTENDED RELEASE ORAL 2 TIMES DAILY
Status: DISCONTINUED | OUTPATIENT
Start: 2024-12-25 | End: 2024-12-26 | Stop reason: HOSPADM

## 2024-12-25 RX ORDER — SODIUM CHLORIDE 0.9 % (FLUSH) 0.9 %
5-40 SYRINGE (ML) INJECTION PRN
Status: DISCONTINUED | OUTPATIENT
Start: 2024-12-25 | End: 2024-12-26 | Stop reason: HOSPADM

## 2024-12-25 RX ORDER — LISINOPRIL 5 MG/1
10 TABLET ORAL DAILY
Status: DISCONTINUED | OUTPATIENT
Start: 2024-12-25 | End: 2024-12-26 | Stop reason: HOSPADM

## 2024-12-25 RX ORDER — 0.9 % SODIUM CHLORIDE 0.9 %
500 INTRAVENOUS SOLUTION INTRAVENOUS
Status: COMPLETED | OUTPATIENT
Start: 2024-12-25 | End: 2024-12-25

## 2024-12-25 RX ORDER — ENOXAPARIN SODIUM 100 MG/ML
40 INJECTION SUBCUTANEOUS DAILY
Status: DISCONTINUED | OUTPATIENT
Start: 2024-12-25 | End: 2024-12-26 | Stop reason: HOSPADM

## 2024-12-25 RX ORDER — OSELTAMIVIR PHOSPHATE 75 MG/1
75 CAPSULE ORAL 2 TIMES DAILY
Qty: 10 CAPSULE | Refills: 0 | Status: SHIPPED | OUTPATIENT
Start: 2024-12-25 | End: 2024-12-25

## 2024-12-25 RX ADMIN — GUAIFENESIN 600 MG: 600 TABLET ORAL at 19:38

## 2024-12-25 RX ADMIN — LORAZEPAM 0.5 MG: 2 INJECTION INTRAMUSCULAR; INTRAVENOUS at 01:06

## 2024-12-25 RX ADMIN — SODIUM CHLORIDE, PRESERVATIVE FREE 10 ML: 5 INJECTION INTRAVENOUS at 09:54

## 2024-12-25 RX ADMIN — SODIUM CHLORIDE, PRESERVATIVE FREE 10 ML: 5 INJECTION INTRAVENOUS at 19:39

## 2024-12-25 RX ADMIN — OSELTAMIVIR PHOSPHATE 75 MG: 75 CAPSULE ORAL at 09:54

## 2024-12-25 RX ADMIN — ALBUTEROL SULFATE 2 PUFF: 90 AEROSOL, METERED RESPIRATORY (INHALATION) at 00:30

## 2024-12-25 RX ADMIN — LISINOPRIL 10 MG: 5 TABLET ORAL at 09:53

## 2024-12-25 RX ADMIN — OSELTAMIVIR PHOSPHATE 75 MG: 75 CAPSULE ORAL at 19:38

## 2024-12-25 RX ADMIN — ENOXAPARIN SODIUM 40 MG: 100 INJECTION SUBCUTANEOUS at 09:54

## 2024-12-25 RX ADMIN — SODIUM CHLORIDE 500 ML: 9 INJECTION, SOLUTION INTRAVENOUS at 01:05

## 2024-12-25 RX ADMIN — GUAIFENESIN 600 MG: 600 TABLET ORAL at 13:09

## 2024-12-25 RX ADMIN — FLUOXETINE HYDROCHLORIDE 40 MG: 20 CAPSULE ORAL at 09:53

## 2024-12-25 RX ADMIN — ATORVASTATIN CALCIUM 10 MG: 10 TABLET, FILM COATED ORAL at 09:54

## 2024-12-25 RX ADMIN — OSELTAMIVIR PHOSPHATE 75 MG: 75 CAPSULE ORAL at 00:29

## 2024-12-25 NOTE — DISCHARGE INSTRUCTIONS
HOSPITALIST DISCHARGE INSTRUCTIONS    NAME: Elda Baig   :  1955   MRN:  [id number]     Date/Time:  2024 11:28 AM    ADMIT DATE: 2024   DISCHARGE DATE: 2024       Influenza A   Sinus tachycardia  HTN  Depression  HLD  It is important that you take the medication exactly as they are prescribed.   Keep your medication in the bottles provided by the pharmacist and keep a list of the medication names, dosages, and times to be taken in your wallet.   Do not take other medications without consulting your doctor.       What to do at Home    Recommended diet:  regular diet    Recommended activity: activity as tolerated      If you have questions regarding the hospital related prescriptions or hospital related issues please call [facility] office at . You can always direct your questions to your primary care doctor if you are unable to reach your hospital physician; your PCP works as an extension of your hospital doctor just like your hospital doctor is an extension of your PCP for your time at the hospital ([facility])    If you experience any of the following symptoms then please call your primary care physician or return to the emergency room if you cannot get hold of your doctor:    Fever, chills, nausea, vomiting, or persistent diarrhea  Worsening weakness or new problems with your speech or balance  Dark stools or visible blood in your stools  New Leg swelling or shortness of breath as these could be signs of a clot    Additional Instructions:      Bring these papers with you to your follow up appointments. The papers will help your doctors be sure to continue the care plan from the hospital.              Information obtained by :  I understand that if any problems occur once I am at home I am to contact my physician.    I understand and acknowledge receipt of the instructions indicated above.

## 2024-12-25 NOTE — ED PROVIDER NOTES
recognition software. Quite often unanticipated grammatical, syntax, homophones, and other interpretive errors are inadvertently transcribed by the computer software. Please disregards these errors. Please excuse any errors that have escaped final proofreading.)     Digna Jones APRN - NP  12/26/24 2862

## 2024-12-25 NOTE — ED NOTES
The patient started becoming tachycardic after her nebulizer treatment. She became anxious and the NP indicated that we would be trying to give her some ativan and a bolus of NS to lower her anxiety and get her heart rate to a nominal level.

## 2024-12-25 NOTE — PROGRESS NOTES
End of Shift Note    Bedside shift change report given to Jodie VALE (oncoming nurse) by Dolores Spring RN (offgoing nurse).  Report included the following information SBAR, Kardex, MAR, Recent Results, and Cardiac Rhythm NSR/Sinus tachy    Shift worked:  Morning   Shift summary and any significant changes:     Pt is alert ox 4 , no complain   Pt is on RA   Independent   Hygiene is done by pt , supplied given    Concerns for physician to address:    -   Zone phone for oncoming shift:   [phone]       Activity:  Level of Assistance: Independent  Number times ambulated in hallways past shift: 0  Number of times OOB to chair past shift: 0    Cardiac:   Cardiac Monitoring: Yes      Cardiac Rhythm: Sinus rhythm    Access:  Current line(s): PIV     Genitourinary:   Urinary Status: Voiding, Bathroom privileges    Respiratory:   O2 Device: None (Room air)  Chronic home O2 use?: NO  Incentive spirometer at bedside: NO    GI:  Last BM (including prior to admit): 12/24/24  Current diet:  ADULT DIET; Regular  Passing flatus: YES    Pain Management:   Patient states pain is manageable on current regimen: YES    Skin:  Bubba Scale Score: 22  Interventions: Wound Offloading (Prevention Methods): Repositioning, Pillows    Patient Safety:  Fall Risk: Nursing Judgement-Fall Risk High(Add Comments): Yes  Fall Risk Interventions  Nursing Judgement-Fall Risk High(Add Comments): Yes  Toilet Every 2 Hours-In Advance of Need: No (Comment)  Hourly Visual Checks: Awake, In bed  Fall Visual Posted: Socks  Room Door Open: Deferred for droplet isolation  Alarm On: Bed  Patient Moved Closer to Nursing Station: No    Active Consults:   None    Length of Stay:  Expected LOS: 3  Actual LOS: 0    Dolores Spring RN

## 2024-12-25 NOTE — PROGRESS NOTES
End of Shift Note    Bedside shift change report given to Dolores/AKANKSHA (oncoming nurse) by Jodie Foley RN (offgoing nurse).  Report included the following information SBAR, Kardex, MAR, Recent Results, and Cardiac Rhythm NSR/Sinus tachy    Shift worked:  1900-0730     Shift summary and any significant changes:     Admitted a 69 years old, female, alert and oriented, on oxygen support via nasal cannula at 2 Lpm, not in distress, ambulatory, denies any pain and intact IV access on left hand.     Concerns for physician to address:       Zone phone for oncoming shift:          Activity:  Level of Assistance: Independent  Number times ambulated in hallways past shift: 0  Number of times OOB to chair past shift: 0    Cardiac:   Cardiac Monitoring: Yes      Cardiac Rhythm: Sinus rhythm    Access:  Current line(s): PIV     Genitourinary:        Respiratory:   O2 Device: Nasal cannula  Chronic home O2 use?: NO  Incentive spirometer at bedside: NO    GI:  Last BM (including prior to admit): 12/24/24  Current diet:  ADULT DIET; Regular  Passing flatus: YES    Pain Management:   Patient states pain is manageable on current regimen: YES    Skin:  Bubba Scale Score: 22  Interventions: Wound Offloading (Prevention Methods): Pillows    Patient Safety:  Fall Risk: Nursing Judgement-Fall Risk High(Add Comments): Yes  Fall Risk Interventions  Nursing Judgement-Fall Risk High(Add Comments): Yes  Toilet Every 2 Hours-In Advance of Need: No (Comment)  Hourly Visual Checks: Awake, In bed  Fall Visual Posted: Socks  Room Door Open: Deferred for droplet isolation  Alarm On: Bed    Active Consults:   None    Length of Stay:  Expected LOS: 3  Actual LOS: 0    Jodie Foley RN

## 2024-12-25 NOTE — H&P
Hospitalist Admission Note    NAME:Elda Baig   : 1955   MRN: [id number]     Date/Time: 2024 3:31 AM    Patient PCP: Rahul Ross MD    *Please be aware this note is formulated with assistance from Dragon voice-recognition dictation software. Please excuse any errors that may be present*    ______________________________________________________________________  Given the patient's current clinical presentation, I have a high level of concern for decompensation if discharged from the emergency department.  Complex decision making was performed, which includes reviewing the patient's available past medical records, laboratory results, and x-ray films.       My assessment of this patient's clinical condition and my plan of care is as follows.    Problem List:  Patient Active Problem List   Diagnosis    Pneumonia due to COVID-19 virus    Influenza         Assessment / Plan:    Influenza A   - Continue support care  - Start tamiflu BID   - Tylenol PRN for fever  - No oxygen requirement     Sinus tachycardia  - Likely in setting of acute illness   - Rates currently low 100's   - Continue supportive care as above     HTN  - Continue home lisinopril    Depression  - Continue fluoxetine    HLD  - Continue simvastatin       Medical Decision Making:   I personally reviewed labs: CBC, CMP  I personally reviewed imaging: Y  I personally reviewed EKG: N  Toxic drug monitoring: N    Discussed case with: ED provider. After discussion I am in agreement that acuity of patient's medical condition necessitates hospital stay.      Code Status: Full Code   DVT Prophylaxis: Lovenox  Diet: ADULT DIET; Regular  Consults: N/A    Subjective:   CHIEF COMPLAINT:    Chief Complaint   Patient presents with    Cough    Lightheadedness     Pt arrives w cc of concern for PNA, has been coughing x a few days, some SOB, lightheadedness, fever, fatigue       HISTORY OF PRESENT ILLNESS:     Elda Baig is a 69 y.o.

## 2024-12-26 VITALS
WEIGHT: 189.15 LBS | DIASTOLIC BLOOD PRESSURE: 68 MMHG | OXYGEN SATURATION: 95 % | HEART RATE: 79 BPM | RESPIRATION RATE: 18 BRPM | TEMPERATURE: 98.6 F | BODY MASS INDEX: 34.81 KG/M2 | HEIGHT: 62 IN | SYSTOLIC BLOOD PRESSURE: 137 MMHG

## 2024-12-26 LAB
ALBUMIN SERPL-MCNC: 3.3 G/DL (ref 3.5–5)
ALBUMIN/GLOB SERPL: 0.9 (ref 1.1–2.2)
ALP SERPL-CCNC: 74 U/L (ref 45–117)
ALT SERPL-CCNC: 49 U/L (ref 12–78)
ANION GAP SERPL CALC-SCNC: 3 MMOL/L (ref 2–12)
AST SERPL-CCNC: 33 U/L (ref 15–37)
BASOPHILS # BLD: 0 K/UL (ref 0–0.1)
BASOPHILS NFR BLD: 0 % (ref 0–1)
BILIRUB SERPL-MCNC: 0.2 MG/DL (ref 0.2–1)
BUN SERPL-MCNC: 13 MG/DL (ref 6–20)
BUN/CREAT SERPL: 19 (ref 12–20)
CALCIUM SERPL-MCNC: 8.5 MG/DL (ref 8.5–10.1)
CHLORIDE SERPL-SCNC: 104 MMOL/L (ref 97–108)
CO2 SERPL-SCNC: 32 MMOL/L (ref 21–32)
CREAT SERPL-MCNC: 0.7 MG/DL (ref 0.55–1.02)
DIFFERENTIAL METHOD BLD: NORMAL
EKG ATRIAL RATE: 104 BPM
EKG DIAGNOSIS: NORMAL
EKG P AXIS: 48 DEGREES
EKG P-R INTERVAL: 128 MS
EKG Q-T INTERVAL: 352 MS
EKG QRS DURATION: 76 MS
EKG QTC CALCULATION (BAZETT): 462 MS
EKG R AXIS: 7 DEGREES
EKG T AXIS: 47 DEGREES
EKG VENTRICULAR RATE: 104 BPM
EOSINOPHIL # BLD: 0 K/UL (ref 0–0.4)
EOSINOPHIL NFR BLD: 0 % (ref 0–7)
ERYTHROCYTE [DISTWIDTH] IN BLOOD BY AUTOMATED COUNT: 12.1 % (ref 11.5–14.5)
GLOBULIN SER CALC-MCNC: 3.5 G/DL (ref 2–4)
GLUCOSE SERPL-MCNC: 206 MG/DL (ref 65–100)
HCT VFR BLD AUTO: 42.1 % (ref 35–47)
HGB BLD-MCNC: 14 G/DL (ref 11.5–16)
IMM GRANULOCYTES # BLD AUTO: 0 K/UL (ref 0–0.04)
IMM GRANULOCYTES NFR BLD AUTO: 0 % (ref 0–0.5)
LYMPHOCYTES # BLD: 1.3 K/UL (ref 0.8–3.5)
LYMPHOCYTES NFR BLD: 34 % (ref 12–49)
MCH RBC QN AUTO: 31 PG (ref 26–34)
MCHC RBC AUTO-ENTMCNC: 33.3 G/DL (ref 30–36.5)
MCV RBC AUTO: 93.3 FL (ref 80–99)
MONOCYTES # BLD: 0.5 K/UL (ref 0–1)
MONOCYTES NFR BLD: 13 % (ref 5–13)
NEUTS SEG # BLD: 2 K/UL (ref 1.8–8)
NEUTS SEG NFR BLD: 53 % (ref 32–75)
NRBC # BLD: 0 K/UL (ref 0–0.01)
NRBC BLD-RTO: 0 PER 100 WBC
PLATELET # BLD AUTO: 243 K/UL (ref 150–400)
PMV BLD AUTO: 9.8 FL (ref 8.9–12.9)
POTASSIUM SERPL-SCNC: 4.1 MMOL/L (ref 3.5–5.1)
PROT SERPL-MCNC: 6.8 G/DL (ref 6.4–8.2)
RBC # BLD AUTO: 4.51 M/UL (ref 3.8–5.2)
SODIUM SERPL-SCNC: 139 MMOL/L (ref 136–145)
WBC # BLD AUTO: 3.9 K/UL (ref 3.6–11)

## 2024-12-26 PROCEDURE — 6370000000 HC RX 637 (ALT 250 FOR IP): Performed by: INTERNAL MEDICINE

## 2024-12-26 PROCEDURE — 2500000003 HC RX 250 WO HCPCS: Performed by: STUDENT IN AN ORGANIZED HEALTH CARE EDUCATION/TRAINING PROGRAM

## 2024-12-26 PROCEDURE — 80053 COMPREHEN METABOLIC PANEL: CPT

## 2024-12-26 PROCEDURE — 36415 COLL VENOUS BLD VENIPUNCTURE: CPT

## 2024-12-26 PROCEDURE — 85025 COMPLETE CBC W/AUTO DIFF WBC: CPT

## 2024-12-26 PROCEDURE — 6370000000 HC RX 637 (ALT 250 FOR IP): Performed by: STUDENT IN AN ORGANIZED HEALTH CARE EDUCATION/TRAINING PROGRAM

## 2024-12-26 RX ORDER — AZITHROMYCIN 500 MG/1
500 TABLET, FILM COATED ORAL DAILY
Qty: 2 TABLET | Refills: 0 | Status: SHIPPED | OUTPATIENT
Start: 2024-12-27 | End: 2024-12-29

## 2024-12-26 RX ORDER — BENZONATATE 100 MG/1
100 CAPSULE ORAL 3 TIMES DAILY PRN
Qty: 10 CAPSULE | Refills: 0 | Status: SHIPPED | OUTPATIENT
Start: 2024-12-26 | End: 2025-01-02

## 2024-12-26 RX ORDER — OSELTAMIVIR PHOSPHATE 75 MG/1
75 CAPSULE ORAL 2 TIMES DAILY
Qty: 7 CAPSULE | Refills: 0 | Status: SHIPPED | OUTPATIENT
Start: 2024-12-26 | End: 2024-12-30

## 2024-12-26 RX ORDER — BENZONATATE 100 MG/1
100 CAPSULE ORAL 3 TIMES DAILY PRN
Status: DISCONTINUED | OUTPATIENT
Start: 2024-12-26 | End: 2024-12-26 | Stop reason: HOSPADM

## 2024-12-26 RX ORDER — AZITHROMYCIN 250 MG/1
500 TABLET, FILM COATED ORAL DAILY
Status: DISCONTINUED | OUTPATIENT
Start: 2024-12-26 | End: 2024-12-26 | Stop reason: HOSPADM

## 2024-12-26 RX ORDER — GUAIFENESIN 600 MG/1
600 TABLET, EXTENDED RELEASE ORAL 2 TIMES DAILY
Qty: 14 TABLET | Refills: 0 | Status: SHIPPED | OUTPATIENT
Start: 2024-12-26 | End: 2025-01-02

## 2024-12-26 RX ADMIN — SODIUM CHLORIDE, PRESERVATIVE FREE 10 ML: 5 INJECTION INTRAVENOUS at 09:20

## 2024-12-26 RX ADMIN — FLUOXETINE HYDROCHLORIDE 40 MG: 20 CAPSULE ORAL at 09:20

## 2024-12-26 RX ADMIN — OSELTAMIVIR PHOSPHATE 75 MG: 75 CAPSULE ORAL at 09:20

## 2024-12-26 RX ADMIN — LISINOPRIL 10 MG: 5 TABLET ORAL at 09:20

## 2024-12-26 RX ADMIN — GUAIFENESIN 600 MG: 600 TABLET ORAL at 09:20

## 2024-12-26 RX ADMIN — AZITHROMYCIN 500 MG: 250 TABLET, FILM COATED ORAL at 09:23

## 2024-12-26 RX ADMIN — ATORVASTATIN CALCIUM 10 MG: 10 TABLET, FILM COATED ORAL at 09:20

## 2024-12-26 NOTE — PROGRESS NOTES
End of Shift Note    Bedside shift change report given to Dolores (oncoming nurse) by Jodie Foley RN (offgoing nurse).  Report included the following information SBAR, Kardex, Intake/Output, MAR, Recent Results, and Cardiac Rhythm NSR    Shift worked:  2427-4458     Shift summary and any significant changes:     Pt.had uneventful night.AXO4, on room air, not  in distress and denies any pain.     Concerns for physician to address:       Zone phone for oncoming shift:          Activity:  Level of Assistance: Independent  Number times ambulated in hallways past shift: 0  Number of times OOB to chair past shift: 1    Cardiac:   Cardiac Monitoring: Yes      Cardiac Rhythm: Sinus rhythm    Access:  Current line(s): PIV     Genitourinary:   Urinary Status: Voiding, Bathroom privileges    Respiratory:   O2 Device: None (Room air)  Chronic home O2 use?: NO  Incentive spirometer at bedside: NO    GI:  Last BM (including prior to admit): 12/24/24  Current diet:  ADULT DIET; Regular  Passing flatus: YES    Pain Management:   Patient states pain is manageable on current regimen: YES    Skin:  Bubba Scale Score: 22  Interventions: Wound Offloading (Prevention Methods): Pillows, Turning    Patient Safety:  Fall Risk: Nursing Judgement-Fall Risk High(Add Comments): Yes  Fall Risk Interventions  Nursing Judgement-Fall Risk High(Add Comments): Yes  Toilet Every 2 Hours-In Advance of Need: No (Comment)  Hourly Visual Checks: Awake, In bed  Fall Visual Posted: Socks  Room Door Open: Deferred for droplet isolation  Alarm On: Bed  Patient Moved Closer to Nursing Station: No    Active Consults:   None    Length of Stay:  Expected LOS: 3  Actual LOS: 1    Jodie Foley, RN

## 2024-12-26 NOTE — PROGRESS NOTES
Patient determined to be stable for discharge by attending provider. I have reviewed the discharge instructions and follow-up appointments with the pt and daughter at bedside. They verbalized understanding and all questions were answered to their satisfaction. No complaints or further questions were expressed.       New medications: Appropriate educational materials and medication side effect teaching were provided.       PIV were removed prior to discharge.     All personal items collected during admission were returned to the patient prior to discharge.    Heydi Lopez RN

## 2024-12-26 NOTE — CARE COORDINATION
Care Management Initial Assessment       RUR: 5%  Readmission? No  1st IM letter given? Yes   1st  letter given: No     Chart reviewed. CM aware of discharge order. Call placed to pt via room phone to complete assessment. Verified contact information and demographics. Pt lives with her daughter in a two level condo with no steps to enter. Pt is typically independent and ambulatory without a device. Pt drives and is retired. No prior hx of SNF/rehab or HHC services reported. Preferred pharmacy is [facility] on [address]. Daughter at bedside will transport home today. IM given on 12/25. No CM needs identified at this time.    Cleared for D/C from CM standpoint.       12/26/24 1214   Service Assessment   Patient Orientation Alert and Oriented   Cognition Alert   History Provided By Patient   Primary Caregiver Self   Accompanied By/Relationship daughter   Support Systems Children;Family Members   Patient's Healthcare Decision Maker is: Legal Next of Kin   PCP Verified by CM Yes   Last Visit to PCP Within last 6 months   Prior Functional Level Independent in ADLs/IADLs   Current Functional Level Independent in ADLs/IADLs   Can patient return to prior living arrangement Yes   Ability to make needs known: Good   Family able to assist with home care needs: Yes   Would you like for me to discuss the discharge plan with any other family members/significant others, and if so, who? No   Financial Resources Medicare   Social/Functional History   Lives With Daughter   Type of Home Condo   Home Layout Two level   Home Access Level entry   Bathroom Equipment None   Bathroom Accessibility Accessible   Home Equipment None   Receives Help From Family   Prior Level of Assist for ADLs Independent   Prior Level of Assist for Homemaking Independent   Ambulation Assistance Independent   Prior Level of Assist for Transfers Independent   Active  Yes   Occupation Retired   Discharge Planning   Type of Residence House   Living

## 2024-12-26 NOTE — PLAN OF CARE
Problem: Discharge Planning  Goal: Discharge to home or other facility with appropriate resources  12/25/2024 0828 by Dolores Spring RN  Outcome: Progressing  12/25/2024 0506 by Jodie Foley RN  Outcome: Progressing     Problem: Safety - Adult  Goal: Free from fall injury  12/25/2024 0828 by Dolores Spring RN  Outcome: Progressing  12/25/2024 0506 by Jodie Foley RN  Outcome: Progressing     Problem: Neurosensory - Adult  Goal: Achieves maximal functionality and self care  Outcome: Progressing     Problem: Respiratory - Adult  Goal: Achieves optimal ventilation and oxygenation  Outcome: Progressing     Problem: Cardiovascular - Adult  Goal: Maintains optimal cardiac output and hemodynamic stability  Outcome: Progressing  Goal: Absence of cardiac dysrhythmias or at baseline  Outcome: Progressing     Problem: Skin/Tissue Integrity - Adult  Goal: Skin integrity remains intact  Outcome: Progressing     Problem: Musculoskeletal - Adult  Goal: Return mobility to safest level of function  Outcome: Progressing     Problem: Gastrointestinal - Adult  Goal: Maintains adequate nutritional intake  Outcome: Progressing     Problem: Genitourinary - Adult  Goal: Absence of urinary retention  Outcome: Progressing     Problem: Infection - Adult  Goal: Absence of infection at discharge  Outcome: Progressing  Goal: Absence of infection during hospitalization  Outcome: Progressing  Goal: Absence of fever/infection during anticipated neutropenic period  Outcome: Progressing     Problem: Metabolic/Fluid and Electrolytes - Adult  Goal: Electrolytes maintained within normal limits  Outcome: Progressing  Goal: Hemodynamic stability and optimal renal function maintained  Outcome: Progressing  Goal: Glucose maintained within prescribed range  Outcome: Progressing     Problem: Hematologic - Adult  Goal: Maintains hematologic stability  Outcome: Progressing     
  Problem: Discharge Planning  Goal: Discharge to home or other facility with appropriate resources  Outcome: Progressing     Problem: Safety - Adult  Goal: Free from fall injury  Outcome: Progressing     
Progressing  12/25/2024 0828 by Dolores Spring RN  Outcome: Progressing     Problem: Infection - Adult  Goal: Absence of infection at discharge  12/25/2024 2109 by Jodie Foley RN  Outcome: Progressing  12/25/2024 0828 by Dolores Spring RN  Outcome: Progressing  Goal: Absence of infection during hospitalization  12/25/2024 2109 by Jodie Foley RN  Outcome: Progressing  12/25/2024 0828 by Dolores Spring RN  Outcome: Progressing  Goal: Absence of fever/infection during anticipated neutropenic period  12/25/2024 2109 by Jodie Foley RN  Outcome: Progressing  12/25/2024 0828 by Dolores Spring RN  Outcome: Progressing     Problem: Metabolic/Fluid and Electrolytes - Adult  Goal: Electrolytes maintained within normal limits  12/25/2024 2109 by Jodie Foley RN  Outcome: Progressing  12/25/2024 0828 by Dolores Spring RN  Outcome: Progressing  Goal: Hemodynamic stability and optimal renal function maintained  12/25/2024 2109 by Jodie Foley RN  Outcome: Progressing  12/25/2024 0828 by Dolores Spring RN  Outcome: Progressing  Goal: Glucose maintained within prescribed range  12/25/2024 2109 by Jodie Foley RN  Outcome: Progressing  12/25/2024 0828 by Dolores Spring RN  Outcome: Progressing     Problem: Hematologic - Adult  Goal: Maintains hematologic stability  12/25/2024 2109 by Jodie Foley RN  Outcome: Progressing  12/25/2024 0828 by Dolores Spring RN  Outcome: Progressing

## 2024-12-26 NOTE — DISCHARGE SUMMARY
Discharge Summary    Name: Elda Baig  [id number]  YOB: 1955 (Age: 69 y.o.)   Date of Admission: 12/24/2024  Date of Discharge: 12/26/2024  Attending Physician: No att. providers found    Discharge Diagnosis:   Influenza A   Sinus tachycardia  HTN  Depression  HLD       Consultations:  None      Brief Admission History/Reason for Admission Per Juan Carlos Nj MD:   Elda Baig is a 69 y.o.  female with PMHx significant for  has a past medical history of Hypertension, Ill-defined condition, and Psychiatric disorder. who presents with the above chief complaint.      We were asked to admit for work up and further evaluation.      Presents with several days of flu like symptoms. Reports cough which has been non-productive as well as fever and chills at home. No wheezing. No nausea, no vomiting. No diarrhea or constipation. Felt like has been feeling worse over past several days which is what prompted her to come to the ED.        Brief Hospital Course by Main Problems:     Influenza A   - Continue support care  - Start tamiflu BID   - Tylenol PRN for fever  - No oxygen requirement   Remains on room air, chest x-ray negative for pneumonia  Today she reports she has more cough, and empiric azithromycin for bacterial superinfection.  Her lungs remains clear     Sinus tachycardia  - Likely in setting of acute illness   -Heart rate improved     HTN  - Continue home lisinopril     Depression  - Continue fluoxetine     HLD  - Continue simvastatin     Discharge Exam:  Patient seen and examined by me on discharge day.  Pertinent Findings:  Patient Vitals for the past 24 hrs:   BP Temp Temp src Pulse Resp SpO2   12/26/24 0801 137/68 98.6 °F (37 °C) Oral 79 18 95 %   12/26/24 0401 (!) 148/72 98.2 °F (36.8 °C) Oral 66 18 93 %   12/26/24 0323 (!) 160/81 97.5 °F (36.4 °C) -- 77 16 95 %   12/25/24 1932 (!) 150/69 98.7 °F (37.1 °C) Oral 79 16 96 %       Gen:    Not in

## 2024-12-27 NOTE — PROGRESS NOTES
Physician Progress Note      PATIENT:               LUNA MONREAL  CSN #:                  [id number]  :                       1955  ADMIT DATE:       2024 9:55 PM  DISCH DATE:        2024 12:40 PM  RESPONDING  PROVIDER #:        Robby Spring MD          QUERY TEXT:    Patient admitted with Influenza A, noted to have a serum sodium of 132 on   admission and received IVF bolus in ED. If possible, please document in   progress notes and discharge summary if you are evaluating and/or treating any   of the following:    The medical record reflects the following:  Risk Factors: Influenza A  Clinical Indicators: Na 132>132>139  Treatment: ED-sodium chloride 0.9 % bolus 500 mL; BMP  Options provided:  -- Hyponatremia  -- Serum sodium not clinically significant  -- Other - I will add my own diagnosis  -- Disagree - Not applicable / Not valid  -- Disagree - Clinically unable to determine / Unknown  -- Refer to Clinical Documentation Reviewer    PROVIDER RESPONSE TEXT:    This patient has hyponatremia.    Query created by: Sravanthi Mccarthy on 2024 11:59 AM      Electronically signed by:  Robby Spring MD 2024 2:08 PM